# Patient Record
Sex: FEMALE | Race: WHITE | NOT HISPANIC OR LATINO | Employment: FULL TIME | URBAN - METROPOLITAN AREA
[De-identification: names, ages, dates, MRNs, and addresses within clinical notes are randomized per-mention and may not be internally consistent; named-entity substitution may affect disease eponyms.]

---
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
## 2021-01-23 ENCOUNTER — OFFICE VISIT (OUTPATIENT)
Dept: URGENT CARE | Facility: CLINIC | Age: 21
End: 2021-01-23
Payer: COMMERCIAL

## 2021-01-23 VITALS — HEIGHT: 68 IN | BODY MASS INDEX: 23.49 KG/M2 | WEIGHT: 155 LBS

## 2021-01-23 DIAGNOSIS — H69.93 DISORDER OF BOTH EUSTACHIAN TUBES: ICD-10-CM

## 2021-01-23 DIAGNOSIS — Z11.59 SCREENING FOR VIRAL DISEASE: ICD-10-CM

## 2021-01-23 DIAGNOSIS — R68.89 FLU-LIKE SYMPTOMS: Primary | ICD-10-CM

## 2021-01-23 LAB — S PYO AG THROAT QL: NEGATIVE

## 2021-01-23 PROCEDURE — U0003 INFECTIOUS AGENT DETECTION BY NUCLEIC ACID (DNA OR RNA); SEVERE ACUTE RESPIRATORY SYNDROME CORONAVIRUS 2 (SARS-COV-2) (CORONAVIRUS DISEASE [COVID-19]), AMPLIFIED PROBE TECHNIQUE, MAKING USE OF HIGH THROUGHPUT TECHNOLOGIES AS DESCRIBED BY CMS-2020-01-R: HCPCS | Performed by: EMERGENCY MEDICINE

## 2021-01-23 PROCEDURE — 87070 CULTURE OTHR SPECIMN AEROBIC: CPT | Performed by: EMERGENCY MEDICINE

## 2021-01-23 PROCEDURE — 87880 STREP A ASSAY W/OPTIC: CPT | Performed by: EMERGENCY MEDICINE

## 2021-01-23 PROCEDURE — U0005 INFEC AGEN DETEC AMPLI PROBE: HCPCS | Performed by: EMERGENCY MEDICINE

## 2021-01-23 PROCEDURE — 99213 OFFICE O/P EST LOW 20 MIN: CPT

## 2021-01-23 NOTE — PROGRESS NOTES
[facility] [facility]        NAME: Steffi Irving is a 24 y o  female  : 2000    MRN: [id number]  DATE: 2021  TIME: 10:28 AM    Assessment and Plan   Flu-like symptoms [R68 89]  1  Flu-like symptoms  POCT rapid strepA    Throat culture   2  Screening for viral disease  Novel Coronavirus (Covid-19),PCR [facility] [place] - Office Collection   3  Disorder of both eustachian tubes       2021 phone call: patient still with significant ear symptoms  During her previous visit I discussed the possibility of calling in prednisone taper if her COVID test returned negative and her symptoms persisted  She would prefer that course of action at this time  Patient Instructions     Patient Instructions     You have been diagnosed with a flu-like illness, and your symptoms should resolve over the next 7 to 10 days with the treatments recommended today  If they do not, it is possible that you have developed a bacterial infection and you should return  If you were to take an antibiotic while you are still in the viral stage, you will not get better any faster, but could kill off  good germs in your body as well as make germs  resistant to the antibiotic  Take an expectorant - guaifenesin should be the only ingredient - during the day, and the cough suppressant (ex  Robitussin DM or Tessalon) if needed at night only  Take Zinc 12 5 to 15 mg every 2 - 3 hrs while awake for the next few days  You may take Cold Randy (13 3 mg of Zinc) or split a 25 mg Zinc tablet or lozenge in two or a 50 mg into four to get the proper dose  The total daily dose of Zinc should exceed 75 mg per day  You may also take vitamin D 3 2000 i u s per day for the next 1 week  You may also take a decongestant like Sudafed, unless you have hypertension or cardiac disease  You may take Imodium for diarrhea according to package instructions  Flu-like illness   AMBULATORY CARE:   Flu-like illness is an infection caused by a virus   The flu is easily spread when an infected person coughs, sneezes, or has close contact with others  You may be able to spread the flu to others for 1 week or longer after signs or symptoms appear  Common signs and symptoms include the following:   · Fever and chills    · Headaches, body aches, and muscle or joint pain    · Cough, runny nose, and sore throat    · Loss of appetite, nausea, vomiting, or diarrhea    · Tiredness    · Trouble breathing  Call 911 for any of the following:   · You have trouble breathing, and your lips look purple or blue  · You have a seizure  Seek care immediately if:   · You are dizzy, or you are urinating less or not at all  · You have a headache with a stiff neck, and you feel tired or confused  · You have new pain or pressure in your chest     · Your symptoms, such as shortness of breath, vomiting, or diarrhea, get worse  · Your symptoms, such as fever and coughing, seem to get better, but then get worse  Contact your healthcare provider if:   · You have new muscle pain or weakness  · You have questions or concerns about your condition or care  Treatment for influenza  may include any of the following:  · Acetaminophen  decreases pain and fever  It is available without a doctor's order  Ask how much to take and how often to take it  Follow directions  Acetaminophen can cause liver damage if not taken correctly  · NSAIDs , such as ibuprofen, help decrease swelling, pain, and fever  This medicine is available with or without a doctor's order  NSAIDs can cause stomach bleeding or kidney problems in certain people  If you take blood thinner medicine, always ask your healthcare provider if NSAIDs are safe for you  Always read the medicine label and follow directions  · Antivirals  help fight a viral infection  Manage your symptoms:   · Rest  as much as you can to help you recover  · Drink liquids as directed  to help prevent dehydration   Ask how much liquid to drink each day and which liquids are best for you  Prevent the spread of the flu:   · Wash your hands often  Use soap and water  Wash your hands after you use the bathroom, change a child's diapers, or sneeze  Wash your hands before you prepare or eat food  Use gel hand cleanser when soap and water are not available  Do not touch your eyes, nose, or mouth unless you have washed your hands first        · Cover your mouth when you sneeze or cough  Cough into a tissue or the bend of your arm  · Clean shared items with a germ-killing   Clean table surfaces, doorknobs, and light switches  Do not share towels, silverware, and dishes with people who are sick  Wash bed sheets, towels, silverware, and dishes with soap and water  · Wear a mask  over your mouth and nose if you are sick or are near anyone who is sick  · Stay away from others  if you are sick  · Influenza vaccine  helps prevent influenza (flu)  Everyone older than 6 months should get a yearly influenza vaccine  Get the vaccine as soon as it is available, usually in September or October each year  Follow up with your healthcare provider as directed:  Write down your questions so you remember to ask them during your visits  © 2017 [address]  Information is for End User's use only and may not be sold, redistributed or otherwise used for commercial purposes  All illustrations and images included in CareNotes® are the copyrighted property of [employer]  or Tavon Agrawal  The above information is an  only  It is not intended as medical advice for individual conditions or treatments  Talk to your doctor, nurse or pharmacist before following any medical regimen to see if it is safe and effective for you  [address]     Your healthcare provider and/or public health staff have evaluated you and have determined that you do not need to be hospitalized at this time    At this time you can be isolated at home where you will be monitored by staff from your local or state health department  You should carefully follow the prevention and isolation steps below until a healthcare provider or local or state health department says that you can return to your normal activities  Stay home except to get medical care     People who are mildly ill with COVID-19 are able to isolate at home during their illness  You should restrict activities outside your home, except for getting medical care  Do not go to work, school, or public areas  Avoid using public transportation, ride-sharing, or taxis  Separate yourself from other people and animals in your home     People: As much as possible, you should stay in a specific room and away from other people in your home  Also, you should use a separate bathroom, if available  Animals: You should restrict contact with pets and other animals while you are sick with COVID-19, just like you would around other people  Although there have not been reports of pets or other animals becoming sick with COVID-19, it is still recommended that people sick with COVID-19 limit contact with animals until more information is known about the virus  When possible, have another member of your household care for your animals while you are sick  If you are sick with COVID-19, avoid contact with your pet, including petting, snuggling, being kissed or licked, and sharing food  If you must care for your pet or be around animals while you are sick, wash your hands before and after you interact with pets and wear a facemask  See COVID-19 and Animals for more information  Call ahead before visiting your doctor     If you have a medical appointment, call the healthcare provider and tell them that you have or may have COVID-19  This will help the healthcare providers office take steps to keep other people from getting infected or exposed       Wear a facemask     You should wear a facemask when you are around other people (e g , sharing a room or vehicle) or pets and before you enter a healthcare providers office  If you are not able to wear a facemask (for example, because it causes trouble breathing), then people who live with you should not stay in the same room with you, or they should wear a facemask if they enter your room  Cover your coughs and sneezes     Cover your mouth and nose with a tissue when you cough or sneeze  Throw used tissues in a lined trash can  Immediately wash your hands with soap and water for at least 20 seconds or, if soap and water are not available, clean your hands with an alcohol-based hand  that contains at least 60% alcohol  Clean your hands often     Wash your hands often with soap and water for at least 20 seconds, especially after blowing your nose, coughing, or sneezing; going to the bathroom; and before eating or preparing food  If soap and water are not readily available, use an alcohol-based hand  with at least 60% alcohol, covering all surfaces of your hands and rubbing them together until they feel dry  Soap and water are the best option if hands are visibly dirty  Avoid touching your eyes, nose, and mouth with unwashed hands  Avoid sharing personal household items     You should not share dishes, drinking glasses, cups, eating utensils, towels, or bedding with other people or pets in your home  After using these items, they should be washed thoroughly with soap and water  Clean all high-touch surfaces everyday     High touch surfaces include counters, tabletops, doorknobs, bathroom fixtures, toilets, phones, keyboards, tablets, and bedside tables  Also, clean any surfaces that may have blood, stool, or body fluids on them  Use a household cleaning spray or wipe, according to the label instructions   Labels contain instructions for safe and effective use of the cleaning product including precautions you should take when applying the product, such as wearing gloves and making sure you have good ventilation during use of the product  Monitor your symptoms     Seek prompt medical attention if your illness is worsening (e g , difficulty breathing)  Before seeking care, call your healthcare provider and tell them that you have, or are being evaluated for, COVID-19  Put on a facemask before you enter the facility  These steps will help the healthcare providers office to keep other people in the office or waiting room from getting infected or exposed  Ask your healthcare provider to call the local or state health department  Persons who are placed under active monitoring or facilitated self-monitoring should follow instructions provided by their local health department or occupational health professionals, as appropriate  If you have a medical emergency and need to call 911, notify the dispatch personnel that you have, or are being evaluated for COVID-19  If possible, put on a facemask before emergency medical services arrive  Discontinuing home isolation     Patients with confirmed COVID-19 should remain under home isolation precautions until the risk of secondary transmission to others is thought to be low  The decision to discontinue home isolation precautions should be made on a case-by-case basis, in consultation with healthcare providers and [facility] and [facility] health departments  Source: [employer]        Proceed to ER if symptoms worsen    Eustachian Tube Dysfunction   AMBULATORY CARE:   Eustachian tube dysfunction (ETD)  is a condition that prevents your eustachian tubes from opening properly  It can also cause them to become blocked  Eustachian tubes connect your middle ear to the back of your nose and throat  These tubes open and allow air to flow in and out when you sneeze, swallow, or yawn       Common signs and symptoms include the following:   · Fullness or pressure in your ears    · Muffled hearing, or a feeling you are hearing under water or have clogged ears    · Pain in one or both ears    · Ringing in your ears    · Popping, crackling, or clicking feeling in your ears    · Trouble keeping your balance    Call your doctor or otolaryngologist if:   · Your symptoms do not improve or get worse  · You have a fever  · You have any hearing loss  · You have questions or concerns about your condition or care  Treatment:  ETD may get better on its own without any treatment  If it continues, you may need any of the following:  · Swallow, yawn, or chew gum  to help open your eustachian tubes  Your healthcare provider may also recommend you blow with your mouth shut and your nostrils pinched closed  · Air pressure devices  push air into your nose and eustachian tubes to help relieve air pressure in your ear  · Treatment for allergies  such as decongestants, antihistamines, and nasal steroids may improve ETD  They may help decrease swelling of the eustachian tubes  · A myringotomy  is surgery to make a hole in your eardrum  The hole relieves pressure and lets fluid drain from your ear  A pressure equalizing (PE) tube may be used to keep the hole open and to help drain fluid  · Tuboplasty  is a procedure to widen your eustachian tubes  Follow up with your doctor or otolaryngologist as directed:  Write down your questions so you remember to ask them during your visits  © Copyright [address] Information is for End User's use only and may not be sold, redistributed or otherwise used for commercial purposes  All illustrations and images included in CareNotes® are the copyrighted property of [employer]  or [address] [place], [place] [address]   The above information is an  only  It is not intended as medical advice for individual conditions or treatments   Talk to your doctor, nurse or pharmacist before following any medical regimen to see if it is safe and effective for you  Follow up with PCP in 3-5 days  Proceed to  ER if symptoms worsen  Chief Complaint     Chief Complaint   Patient presents with    COVID-19     sore throat and ear pain, symptoms started yesterday         History of Present Illness        Patient complains of sore throat, cough, congestion, bilateral earache since yesterday  Review of Systems   Review of Systems   Constitutional: Negative for chills and fever  HENT: Positive for congestion, ear pain, rhinorrhea, sinus pressure and sore throat  Negative for trouble swallowing and voice change  Respiratory: Positive for cough  Negative for chest tightness, shortness of breath and wheezing  Cardiovascular: Negative for chest pain  Current Medications       Current Outpatient Medications:     Mono-Linyah 0 25-35 MG-MCG per tablet, Take 1 tablet by mouth daily, Disp: , Rfl:     predniSONE 10 mg tablet, Take once daily all days pills on this schedule 6- 6- 5- 4- 3- 2- 1 (Patient not taking: Reported on 1/23/2021), Disp: 27 tablet, Rfl: 0    Current Allergies     Allergies as of 01/23/2021 - Reviewed 01/23/2021   Allergen Reaction Noted    Bactrim [sulfamethoxazole-trimethoprim] Hives 12/30/2020            The following portions of the patient's history were reviewed and updated as appropriate: allergies, current medications, past family history, past medical history, past social history, past surgical history and problem list      Past Medical History:   Diagnosis Date    Known health problems: none        Past Surgical History:   Procedure Laterality Date    WISDOM TOOTH EXTRACTION         History reviewed  No pertinent family history  Medications have been verified  Objective   Ht 5' 8" (1 727 m)   Wt 70 3 kg (155 lb)   LMP 01/13/2021   BMI 23 57 kg/m²        Physical Exam     Physical Exam  Vitals signs and nursing note reviewed     Constitutional: General: She is not in acute distress  Appearance: She is well-developed  HENT:      Head: Normocephalic and atraumatic  Right Ear: Tympanic membrane and external ear normal       Left Ear: Tympanic membrane and external ear normal       Nose: Mucosal edema present  Mouth/Throat:      Pharynx: Posterior oropharyngeal erythema present  No oropharyngeal exudate  Tonsils: No tonsillar abscesses  Neck:      Musculoskeletal: Neck supple  Cardiovascular:      Rate and Rhythm: Normal rate and regular rhythm  Pulmonary:      Effort: Pulmonary effort is normal  No respiratory distress  Breath sounds: No wheezing or rales  Skin:     General: Skin is warm and dry  Neurological:      Mental Status: She is alert and oriented to person, place, and time  Psychiatric:         Behavior: Behavior normal          Thought Content:  Thought content normal          Judgment: Judgment normal

## 2021-01-23 NOTE — PATIENT INSTRUCTIONS
You have been diagnosed with a flu-like illness, and your symptoms should resolve over the next 7 to 10 days with the treatments recommended today  If they do not, it is possible that you have developed a bacterial infection and you should return  If you were to take an antibiotic while you are still in the viral stage, you will not get better any faster, but could kill off  good germs in your body as well as make germs  resistant to the antibiotic  Take an expectorant - guaifenesin should be the only ingredient - during the day, and the cough suppressant (ex  Robitussin DM or Tessalon) if needed at night only  Take Zinc 12 5 to 15 mg every 2 - 3 hrs while awake for the next few days  You may take Cold Randy (13 3 mg of Zinc) or split a 25 mg Zinc tablet or lozenge in two or a 50 mg into four to get the proper dose  The total daily dose of Zinc should exceed 75 mg per day  You may also take vitamin D 3 2000 i u s per day for the next 1 week  You may also take a decongestant like Sudafed, unless you have hypertension or cardiac disease  You may take Imodium for diarrhea according to package instructions  Flu-like illness   AMBULATORY CARE:   Flu-like illness is an infection caused by a virus  The flu is easily spread when an infected person coughs, sneezes, or has close contact with others  You may be able to spread the flu to others for 1 week or longer after signs or symptoms appear  Common signs and symptoms include the following:   · Fever and chills    · Headaches, body aches, and muscle or joint pain    · Cough, runny nose, and sore throat    · Loss of appetite, nausea, vomiting, or diarrhea    · Tiredness    · Trouble breathing  Call 911 for any of the following:   · You have trouble breathing, and your lips look purple or blue  · You have a seizure  Seek care immediately if:   · You are dizzy, or you are urinating less or not at all       · You have a headache with a stiff neck, and you feel tired or confused  · You have new pain or pressure in your chest     · Your symptoms, such as shortness of breath, vomiting, or diarrhea, get worse  · Your symptoms, such as fever and coughing, seem to get better, but then get worse  Contact your healthcare provider if:   · You have new muscle pain or weakness  · You have questions or concerns about your condition or care  Treatment for influenza  may include any of the following:  · Acetaminophen  decreases pain and fever  It is available without a doctor's order  Ask how much to take and how often to take it  Follow directions  Acetaminophen can cause liver damage if not taken correctly  · NSAIDs , such as ibuprofen, help decrease swelling, pain, and fever  This medicine is available with or without a doctor's order  NSAIDs can cause stomach bleeding or kidney problems in certain people  If you take blood thinner medicine, always ask your healthcare provider if NSAIDs are safe for you  Always read the medicine label and follow directions  · Antivirals  help fight a viral infection  Manage your symptoms:   · Rest  as much as you can to help you recover  · Drink liquids as directed  to help prevent dehydration  Ask how much liquid to drink each day and which liquids are best for you  Prevent the spread of the flu:   · Wash your hands often  Use soap and water  Wash your hands after you use the bathroom, change a child's diapers, or sneeze  Wash your hands before you prepare or eat food  Use gel hand cleanser when soap and water are not available  Do not touch your eyes, nose, or mouth unless you have washed your hands first        · Cover your mouth when you sneeze or cough  Cough into a tissue or the bend of your arm  · Clean shared items with a germ-killing   Clean table surfaces, doorknobs, and light switches  Do not share towels, silverware, and dishes with people who are sick   Wash bed sheets, towels, silverware, and dishes with soap and water  · Wear a mask  over your mouth and nose if you are sick or are near anyone who is sick  · Stay away from others  if you are sick  · Influenza vaccine  helps prevent influenza (flu)  Everyone older than 6 months should get a yearly influenza vaccine  Get the vaccine as soon as it is available, usually in September or October each year  Follow up with your healthcare provider as directed:  Write down your questions so you remember to ask them during your visits  © 2017 [facility] Information is for End User's use only and may not be sold, redistributed or otherwise used for commercial purposes  All illustrations and images included in CareNotes® are the copyrighted property of A D A M [employer] Inc  or Tavon Agrawal  The above information is an  only  It is not intended as medical advice for individual conditions or treatments  Talk to your doctor, nurse or pharmacist before following any medical regimen to see if it is safe and effective for you  [address]     Your healthcare provider and/or public health staff have evaluated you and have determined that you do not need to be hospitalized at this time  At this time you can be isolated at home where you will be monitored by staff from your local or state health department  You should carefully follow the prevention and isolation steps below until a healthcare provider or local or state health department says that you can return to your normal activities  Stay home except to get medical care     People who are mildly ill with COVID-19 are able to isolate at home during their illness  You should restrict activities outside your home, except for getting medical care  Do not go to work, school, or public areas  Avoid using public transportation, ride-sharing, or taxis       Separate yourself from other people and animals in your home     People: As much as possible, you should stay in a specific room and away from other people in your home  Also, you should use a separate bathroom, if available  Animals: You should restrict contact with pets and other animals while you are sick with COVID-19, just like you would around other people  Although there have not been reports of pets or other animals becoming sick with COVID-19, it is still recommended that people sick with COVID-19 limit contact with animals until more information is known about the virus  When possible, have another member of your household care for your animals while you are sick  If you are sick with COVID-19, avoid contact with your pet, including petting, snuggling, being kissed or licked, and sharing food  If you must care for your pet or be around animals while you are sick, wash your hands before and after you interact with pets and wear a facemask  See COVID-19 and Animals for more information  Call ahead before visiting your doctor     If you have a medical appointment, call the healthcare provider and tell them that you have or may have COVID-19  This will help the healthcare providers office take steps to keep other people from getting infected or exposed  Wear a facemask     You should wear a facemask when you are around other people (e g , sharing a room or vehicle) or pets and before you enter a healthcare providers office  If you are not able to wear a facemask (for example, because it causes trouble breathing), then people who live with you should not stay in the same room with you, or they should wear a facemask if they enter your room  Cover your coughs and sneezes     Cover your mouth and nose with a tissue when you cough or sneeze  Throw used tissues in a lined trash can   Immediately wash your hands with soap and water for at least 20 seconds or, if soap and water are not available, clean your hands with an alcohol-based hand  that contains at least 60% alcohol  Clean your hands often     Wash your hands often with soap and water for at least 20 seconds, especially after blowing your nose, coughing, or sneezing; going to the bathroom; and before eating or preparing food  If soap and water are not readily available, use an alcohol-based hand  with at least 60% alcohol, covering all surfaces of your hands and rubbing them together until they feel dry  Soap and water are the best option if hands are visibly dirty  Avoid touching your eyes, nose, and mouth with unwashed hands  Avoid sharing personal household items     You should not share dishes, drinking glasses, cups, eating utensils, towels, or bedding with other people or pets in your home  After using these items, they should be washed thoroughly with soap and water  Clean all high-touch surfaces everyday     High touch surfaces include counters, tabletops, doorknobs, bathroom fixtures, toilets, phones, keyboards, tablets, and bedside tables  Also, clean any surfaces that may have blood, stool, or body fluids on them  Use a household cleaning spray or wipe, according to the label instructions  Labels contain instructions for safe and effective use of the cleaning product including precautions you should take when applying the product, such as wearing gloves and making sure you have good ventilation during use of the product  Monitor your symptoms     Seek prompt medical attention if your illness is worsening (e g , difficulty breathing)  Before seeking care, call your healthcare provider and tell them that you have, or are being evaluated for, COVID-19  Put on a facemask before you enter the facility  These steps will help the healthcare providers office to keep other people in the office or waiting room from getting infected or exposed  Ask your healthcare provider to call the local or state health department   Persons who are placed under active monitoring or facilitated self-monitoring should follow instructions provided by their local health department or occupational health professionals, as appropriate  If you have a medical emergency and need to call 911, notify the dispatch personnel that you have, or are being evaluated for COVID-19  If possible, put on a facemask before emergency medical services arrive  Discontinuing home isolation     Patients with confirmed COVID-19 should remain under home isolation precautions until the risk of secondary transmission to others is thought to be low  The decision to discontinue home isolation precautions should be made on a case-by-case basis, in consultation with healthcare providers and state and local health departments  Source: [employer]        Proceed to ER if symptoms worsen    Eustachian Tube Dysfunction   AMBULATORY CARE:   Eustachian tube dysfunction (ETD)  is a condition that prevents your eustachian tubes from opening properly  It can also cause them to become blocked  Eustachian tubes connect your middle ear to the back of your nose and throat  These tubes open and allow air to flow in and out when you sneeze, swallow, or yawn  Common signs and symptoms include the following:   · Fullness or pressure in your ears    · Muffled hearing, or a feeling you are hearing under water or have clogged ears    · Pain in one or both ears    · Ringing in your ears    · Popping, crackling, or clicking feeling in your ears    · Trouble keeping your balance    Call your doctor or otolaryngologist if:   · Your symptoms do not improve or get worse  · You have a fever  · You have any hearing loss  · You have questions or concerns about your condition or care  Treatment:  ETD may get better on its own without any treatment  If it continues, you may need any of the following:  · Swallow, yawn, or chew gum  to help open your eustachian tubes   Your healthcare provider may also recommend you blow with your mouth shut and your nostrils pinched closed  · Air pressure devices  push air into your nose and eustachian tubes to help relieve air pressure in your ear  · Treatment for allergies  such as decongestants, antihistamines, and nasal steroids may improve ETD  They may help decrease swelling of the eustachian tubes  · A myringotomy  is surgery to make a hole in your eardrum  The hole relieves pressure and lets fluid drain from your ear  A pressure equalizing (PE) tube may be used to keep the hole open and to help drain fluid  · Tuboplasty  is a procedure to widen your eustachian tubes  Follow up with your doctor or otolaryngologist as directed:  Write down your questions so you remember to ask them during your visits  © Copyright [address] Information is for End User's use only and may not be sold, redistributed or otherwise used for commercial purposes  All illustrations and images included in CareNotes® are the copyrighted property of [employer]  or [facility] Leo Haywood   The above information is an  only  It is not intended as medical advice for individual conditions or treatments  Talk to your doctor, nurse or pharmacist before following any medical regimen to see if it is safe and effective for you

## 2021-01-25 ENCOUNTER — TELEPHONE (OUTPATIENT)
Dept: URGENT CARE | Facility: CLINIC | Age: 21
End: 2021-01-25

## 2021-01-25 LAB
BACTERIA THROAT CULT: NORMAL
SARS-COV-2 RNA RESP QL NAA+PROBE: NEGATIVE

## 2021-01-25 RX ORDER — PREDNISONE 10 MG/1
TABLET ORAL
Qty: 27 TABLET | Refills: 0 | Status: SHIPPED | OUTPATIENT
Start: 2021-01-25

## 2021-04-11 ENCOUNTER — OFFICE VISIT (OUTPATIENT)
Dept: URGENT CARE | Facility: CLINIC | Age: 21
End: 2021-04-11
Payer: COMMERCIAL

## 2021-04-11 ENCOUNTER — APPOINTMENT (OUTPATIENT)
Dept: RADIOLOGY | Facility: CLINIC | Age: 21
End: 2021-04-11
Payer: COMMERCIAL

## 2021-04-11 VITALS
HEART RATE: 79 BPM | RESPIRATION RATE: 16 BRPM | OXYGEN SATURATION: 99 % | WEIGHT: 155 LBS | BODY MASS INDEX: 22.96 KG/M2 | HEIGHT: 69 IN | SYSTOLIC BLOOD PRESSURE: 138 MMHG | DIASTOLIC BLOOD PRESSURE: 76 MMHG | TEMPERATURE: 98.2 F

## 2021-04-11 DIAGNOSIS — R10.9 FLANK PAIN: ICD-10-CM

## 2021-04-11 DIAGNOSIS — R31.9 HEMATURIA, UNSPECIFIED TYPE: Primary | ICD-10-CM

## 2021-04-11 LAB
SL AMB  POCT GLUCOSE, UA: NEGATIVE
SL AMB LEUKOCYTE ESTERASE,UA: NEGATIVE
SL AMB POCT BILIRUBIN,UA: NEGATIVE
SL AMB POCT BLOOD,UA: ABNORMAL
SL AMB POCT CLARITY,UA: CLEAR
SL AMB POCT COLOR,UA: YELLOW
SL AMB POCT KETONES,UA: NEGATIVE
SL AMB POCT NITRITE,UA: NEGATIVE
SL AMB POCT PH,UA: 6
SL AMB POCT SPECIFIC GRAVITY,UA: 1.02
SL AMB POCT URINE PROTEIN: NEGATIVE
SL AMB POCT UROBILINOGEN: NORMAL

## 2021-04-11 PROCEDURE — 81002 URINALYSIS NONAUTO W/O SCOPE: CPT | Performed by: PHYSICIAN ASSISTANT

## 2021-04-11 PROCEDURE — 99214 OFFICE O/P EST MOD 30 MIN: CPT | Performed by: PHYSICIAN ASSISTANT

## 2021-04-11 PROCEDURE — 74018 RADEX ABDOMEN 1 VIEW: CPT

## 2021-04-11 PROCEDURE — 87086 URINE CULTURE/COLONY COUNT: CPT | Performed by: PHYSICIAN ASSISTANT

## 2021-04-11 RX ORDER — TAMSULOSIN HYDROCHLORIDE 0.4 MG/1
0.4 CAPSULE ORAL
Qty: 15 CAPSULE | Refills: 0 | Status: SHIPPED | OUTPATIENT
Start: 2021-04-11

## 2021-04-11 RX ORDER — KETOROLAC TROMETHAMINE 10 MG/1
10 TABLET, FILM COATED ORAL EVERY 6 HOURS PRN
Qty: 30 TABLET | Refills: 0 | Status: SHIPPED | OUTPATIENT
Start: 2021-04-11

## 2021-04-11 RX ORDER — NITROFURANTOIN 25; 75 MG/1; MG/1
100 CAPSULE ORAL 2 TIMES DAILY
Qty: 10 CAPSULE | Refills: 0 | Status: SHIPPED | OUTPATIENT
Start: 2021-04-11 | End: 2021-04-16

## 2021-04-11 NOTE — PATIENT INSTRUCTIONS
KUB negative on wet read  Urine dip only positive for hematuria  Will start flomax daily to help pass a possible small uretral stone that may be causing bleeding  May take keterolac up to 4x daily for pain  Will start macrobid in case of infection, will send urine for culture  Discussed diagnosis is uncertain at this time, at this point patient is not in any acute distress and vitals are all stable    If symptoms persist for the next 2-3 days, patient should see Urology for further workup as hematuria can have a multitude of causes, from benign to malignant  If symptoms become severe or are accompanied by fever, chills, n/v, severe pain, gross bleeding, or any other distress, patient should go to the ER immediately

## 2021-04-11 NOTE — PROGRESS NOTES
[facility]        NAME: Charles Terrazas is a 24 y o  female  : 2000    MRN: [id number]  DATE: 2021  TIME: 4:59 PM    Assessment and Plan   Hematuria, unspecified type [R31 9]  1  Hematuria, unspecified type  POCT urine dip    XR abdomen 1 view kub    Urine culture    nitrofurantoin (MACROBID) 100 mg capsule    ketorolac (TORADOL) 10 mg tablet    tamsulosin (FLOMAX) 0 4 mg    Ambulatory referral to Urology         Patient Instructions     Patient Instructions   KUB negative on wet read  Urine dip only positive for hematuria  Will start flomax daily to help pass a possible small uretral stone that may be causing bleeding  May take keterolac up to 4x daily for pain  Will start macrobid in case of infection, will send urine for culture  Discussed diagnosis is uncertain at this time, at this point patient is not in any acute distress and vitals are all stable  If symptoms persist for the next 2-3 days, patient should see Urology for further workup as hematuria can have a multitude of causes, from benign to malignant  If symptoms become severe or are accompanied by fever, chills, n/v, severe pain, gross bleeding, or any other distress, patient should go to the ER immediately      Follow up with PCP in 3-5 days  Proceed to  ER if symptoms worsen  Chief Complaint     Chief Complaint   Patient presents with    Flank Pain     pt states she had blood in her urine 2x yesterday  no blood today but is having b/l flank pain         History of Present Illness       23 y/o F presents with mother c/o hematuria x 2 days  Yesterday was bright red, then became clear, then pink  Had one episode of blood in urine today  Menses ended two days ago and was normal  Has associated low back pain b/l but no fever, chills, dysuria, urinary frequency or urgency, n/v, abdominal pain  No FHx of kidney stones   Concerned because sister was hospitalized from pyelonephritis that she had ignored      Review of Systems Review of Systems   Constitutional: Negative for chills and fever  Respiratory: Negative for cough and shortness of breath  Cardiovascular: Negative for chest pain  Gastrointestinal: Negative for abdominal pain, nausea and vomiting  Genitourinary: Positive for hematuria  Negative for decreased urine volume, difficulty urinating, dyspareunia, dysuria, flank pain, frequency, menstrual problem, pelvic pain, urgency, vaginal bleeding, vaginal discharge and vaginal pain  Musculoskeletal: Positive for back pain  Neurological: Negative for light-headedness           Current Medications       Current Outpatient Medications:     Mono-Linyah 0 25-35 MG-MCG per tablet, Take 1 tablet by mouth daily, Disp: , Rfl:     ketorolac (TORADOL) 10 mg tablet, Take 1 tablet (10 mg total) by mouth every 6 (six) hours as needed for moderate pain, Disp: 30 tablet, Rfl: 0    nitrofurantoin (MACROBID) 100 mg capsule, Take 1 capsule (100 mg total) by mouth 2 (two) times a day for 5 days, Disp: 10 capsule, Rfl: 0    predniSONE 10 mg tablet, Take once daily all days pills on this schedule 6- 6- 5- 4- 3- 2- 1 (Patient not taking: Reported on 1/23/2021), Disp: 27 tablet, Rfl: 0    predniSONE 10 mg tablet, Take once daily all days pills on this schedule 6- 6- 5- 4- 3- 2- 1 (Patient not taking: Reported on 4/11/2021), Disp: 27 tablet, Rfl: 0    tamsulosin (FLOMAX) 0 4 mg, Take 1 capsule (0 4 mg total) by mouth daily with dinner, Disp: 15 capsule, Rfl: 0    Current Allergies     Allergies as of 04/11/2021 - Reviewed 04/11/2021   Allergen Reaction Noted    Bactrim [sulfamethoxazole-trimethoprim] Hives 12/30/2020            The following portions of the patient's history were reviewed and updated as appropriate: allergies, current medications, past family history, past medical history, past social history, past surgical history and problem list      Past Medical History:   Diagnosis Date    Known health problems: none        Past Surgical History:   Procedure Laterality Date    WISDOM TOOTH EXTRACTION         No family history on file  Medications have been verified  Objective   /76   Pulse 79   Temp 98 2 °F (36 8 °C)   Resp 16   Ht 5' 9" (1 753 m)   Wt 70 3 kg (155 lb)   LMP 04/06/2021   SpO2 99%   BMI 22 89 kg/m²   Patient's last menstrual period was 04/06/2021  Physical Exam     Physical Exam  Constitutional:       General: She is not in acute distress  Appearance: She is well-developed  She is not diaphoretic  HENT:      Head: Normocephalic and atraumatic  Mouth/Throat:      Mouth: Mucous membranes are moist       Pharynx: Oropharynx is clear  Eyes:      General: No scleral icterus  Right eye: No discharge  Left eye: No discharge  Conjunctiva/sclera: Conjunctivae normal       Pupils: Pupils are equal, round, and reactive to light  Neck:      Thyroid: No thyromegaly  Cardiovascular:      Rate and Rhythm: Normal rate and regular rhythm  Heart sounds: Normal heart sounds  No murmur  No friction rub  No gallop  Pulmonary:      Effort: Pulmonary effort is normal  No respiratory distress  Breath sounds: Normal breath sounds  No wheezing or rales  Abdominal:      General: Bowel sounds are normal  There is no distension  Palpations: Abdomen is soft  There is no mass  Tenderness: There is no abdominal tenderness  There is no right CVA tenderness, left CVA tenderness, guarding or rebound  Comments: Mild tenderness of b/l low back   Musculoskeletal: Normal range of motion  Skin:     General: Skin is warm and dry  Coloration: Skin is not pale  Findings: No erythema or rash  Neurological:      Mental Status: She is alert and oriented to person, place, and time  Cranial Nerves: No cranial nerve deficit

## 2021-04-12 LAB — BACTERIA UR CULT: NORMAL

## 2022-06-13 ENCOUNTER — HOSPITAL ENCOUNTER (OUTPATIENT)
Dept: NUCLEAR MEDICINE | Facility: HOSPITAL | Age: 22
Discharge: HOME | End: 2022-06-13
Payer: COMMERCIAL

## 2022-06-13 DIAGNOSIS — R14.0 ABDOMINAL DISTENSION (GASEOUS): ICD-10-CM

## 2022-06-13 DIAGNOSIS — R68.81 EARLY SATIETY: ICD-10-CM

## 2022-06-13 DIAGNOSIS — R10.84 ABDOMINAL PAIN, GENERALIZED: ICD-10-CM

## 2022-06-13 PROCEDURE — A9541 TC99M SULFUR COLLOID: HCPCS

## 2022-06-13 PROCEDURE — 78264 GASTRIC EMPTYING IMG STUDY: CPT

## 2022-06-13 PROCEDURE — G1004 CDSM NDSC: HCPCS

## 2024-06-04 ENCOUNTER — APPOINTMENT (OUTPATIENT)
Dept: RADIOLOGY | Facility: CLINIC | Age: 24
End: 2024-06-04
Payer: COMMERCIAL

## 2024-06-04 ENCOUNTER — OFFICE VISIT (OUTPATIENT)
Dept: URGENT CARE | Facility: CLINIC | Age: 24
End: 2024-06-04
Payer: COMMERCIAL

## 2024-06-04 VITALS
SYSTOLIC BLOOD PRESSURE: 137 MMHG | RESPIRATION RATE: 16 BRPM | OXYGEN SATURATION: 99 % | DIASTOLIC BLOOD PRESSURE: 88 MMHG | BODY MASS INDEX: 22.13 KG/M2 | TEMPERATURE: 98.5 F | HEIGHT: 68 IN | HEART RATE: 78 BPM | WEIGHT: 146 LBS

## 2024-06-04 DIAGNOSIS — M25.572 ACUTE LEFT ANKLE PAIN: Primary | ICD-10-CM

## 2024-06-04 DIAGNOSIS — M25.572 ACUTE LEFT ANKLE PAIN: ICD-10-CM

## 2024-06-04 PROCEDURE — 29515 APPLICATION SHORT LEG SPLINT: CPT | Performed by: PHYSICIAN ASSISTANT

## 2024-06-04 PROCEDURE — G0382 LEV 3 HOSP TYPE B ED VISIT: HCPCS | Performed by: PHYSICIAN ASSISTANT

## 2024-06-04 PROCEDURE — S9083 URGENT CARE CENTER GLOBAL: HCPCS | Performed by: PHYSICIAN ASSISTANT

## 2024-06-04 PROCEDURE — 73610 X-RAY EXAM OF ANKLE: CPT

## 2024-06-04 RX ORDER — PREDNISONE 10 MG/1
10 TABLET ORAL DAILY
Qty: 21 TABLET | Refills: 0 | Status: SHIPPED | OUTPATIENT
Start: 2024-06-04

## 2024-06-04 RX ORDER — FLUTICASONE PROPIONATE 50 MCG
1 SPRAY, SUSPENSION (ML) NASAL DAILY
COMMUNITY

## 2024-06-04 NOTE — PROGRESS NOTES
[facility]        NAME: Shilpi Branham is a 23 y.o. female  : 2000    MRN: [id number]  DATE: 2024  TIME: 12:36 PM    Assessment and Plan   Acute left ankle pain [M25.572]  1. Acute left ankle pain  XR ankle 3+ vw left    Ambulatory Referral to Physical Therapy    Ambulatory Referral to Orthopedic Surgery    predniSONE 10 mg tablet    Orthopedic injury treatment            Patient Instructions   Rest, ice, compression, elevation.  Ankle brace.  Prednisone.  Tylenol.  Physical therapy.  Orthopedics.      Follow up with PCP in 3-5 days.  Proceed to  ER if symptoms worsen.    If tests have been performed at [facility], our office will contact you with results if changes need to be made to the care plan discussed with you at the visit.  You can review your full results on [facility].    Chief Complaint     Chief Complaint   Patient presents with    Ankle Pain     Left ankle pain starting a little less than 2 weeks ago. Denies any injuries to the area.          History of Present Illness       Patient is a 23-year-old female with no significant past medical history presents the office planing of left ankle pain for a little less than 2 weeks.  Denies any injury, trauma, or increased physical activity.  States she has had issues with this ankle in the past while playing volleyball but it has not had pain in a long time.  Pain described as 6 out of 10 stabbing and shooting that feels like a needle going right through her ankle.  Pain only occurs when she has weight on the ball of her foot such as going downstairs or when pushing off with walking.  She has been taking ibuprofen which helps with pain.        Review of Systems   Review of Systems   Musculoskeletal:  Positive for arthralgias. Negative for joint swelling.   Neurological:  Negative for numbness.         Current Medications       Current Outpatient Medications:     fluticasone (FLONASE) 50 mcg/act nasal spray, 1 spray into each  "nostril daily, Disp: , Rfl:     Mono-Linyah 0.25-35 MG-MCG per tablet, Take 1 tablet by mouth daily, Disp: , Rfl:     predniSONE 10 mg tablet, Take 1 tablet (10 mg total) by mouth daily Take 6 on day 1, take 5 on day 2, take 4 on day 3, take 3 on day 4, take 2 on day 5, take 1 on day 6., Disp: 21 tablet, Rfl: 0    Current Allergies     Allergies as of 06/04/2024 - Reviewed 06/04/2024   Allergen Reaction Noted    Bactrim [sulfamethoxazole-trimethoprim] Hives 12/30/2020    Pollen extract Other (See Comments) 08/12/2019            The following portions of the patient's history were reviewed and updated as appropriate: allergies, current medications, past family history, past medical history, past social history, past surgical history and problem list.     Past Medical History:   Diagnosis Date    Known health problems: none        Past Surgical History:   Procedure Laterality Date    WISDOM TOOTH EXTRACTION         Family History   Problem Relation Age of Onset    No Known Problems Mother     No Known Problems Father          Medications have been verified.        Objective   /88   Pulse 78   Temp 98.5 °F (36.9 °C)   Resp 16   Ht 5' 8\" (1.727 m)   Wt 66.2 kg (146 lb)   LMP  (LMP Unknown)   SpO2 99%   BMI 22.20 kg/m²   No LMP recorded (lmp unknown). (Menstrual status: Birth Control).       Physical Exam     Physical Exam  Vitals and nursing note reviewed.   Constitutional:       Appearance: She is well-developed.   HENT:      Head: Normocephalic and atraumatic.      Right Ear: External ear normal.      Left Ear: External ear normal.      Nose: Nose normal.   Eyes:      General: Lids are normal.      Conjunctiva/sclera: Conjunctivae normal.   Musculoskeletal:      Left ankle: No swelling, deformity or ecchymosis. No tenderness. Normal range of motion. Anterior drawer test negative. Normal pulse.      Left Achilles Tendon: Normal. No tenderness.      Left foot: Normal. Normal range of motion. No swelling, " prominent metatarsal heads or bony tenderness.   Skin:     General: Skin is warm and dry.      Capillary Refill: Capillary refill takes less than 2 seconds.      Findings: No rash.   Neurological:      Mental Status: She is alert.         Left ankle x-ray: WNL        Orthopedic injury treatment    Date/Time: 6/4/2024 12:00 PM    Performed by: Curtis Vang PA-C  Authorized by: Curtis Vang PA-C    Patient Location:  Bedside  [facility] Protocol:  Consent: Verbal consent obtained.  Risks and benefits: risks, benefits and alternatives were discussed  Consent given by: patient  Patient understanding: patient states understanding of the procedure being performed  Patient consent: the patient's understanding of the procedure matches consent given  Patient identity confirmed: verbally with patient    Injury location:  Ankle  Location details:  Left ankle  Injury type:  Soft tissue  Neurovascular status: Neurovascularly intact    Distal perfusion: normal    Neurological function: normal    Range of motion: normal    Immobilization:  Splint (premade static lace up ankle brace)  Neurovascular status: Neurovascularly intact    Distal perfusion: normal    Neurological function: normal    Range of motion: unchanged    Patient tolerance:  Patient tolerated the procedure well with no immediate complications

## 2024-06-04 NOTE — PATIENT INSTRUCTIONS
Rest, ice, compression, elevation.  Ankle brace.  Prednisone.  Tylenol.  Physical therapy.  Orthopedics.  PCP in 3 to 5 days if no improvement.

## 2024-07-09 ENCOUNTER — OFFICE VISIT (OUTPATIENT)
Dept: OBGYN CLINIC | Facility: CLINIC | Age: 24
End: 2024-07-09
Payer: COMMERCIAL

## 2024-07-09 VITALS
OXYGEN SATURATION: 99 % | DIASTOLIC BLOOD PRESSURE: 70 MMHG | WEIGHT: 144.6 LBS | HEART RATE: 74 BPM | HEIGHT: 68 IN | TEMPERATURE: 97.7 F | SYSTOLIC BLOOD PRESSURE: 118 MMHG | BODY MASS INDEX: 21.92 KG/M2

## 2024-07-09 DIAGNOSIS — M25.572 ACUTE LEFT ANKLE PAIN: ICD-10-CM

## 2024-07-09 DIAGNOSIS — M76.829 TIBIALIS POSTERIOR SYNDROME: Primary | ICD-10-CM

## 2024-07-09 PROCEDURE — 99203 OFFICE O/P NEW LOW 30 MIN: CPT | Performed by: STUDENT IN AN ORGANIZED HEALTH CARE EDUCATION/TRAINING PROGRAM

## 2024-07-09 NOTE — PROGRESS NOTES
Ankle Examination (focused):     Gait: no limp      RIGHT LEFT   Inspection Erythema none none    Edema none none    Ecchymosis none none         ROM:  Plantarflexion 50 50    Dorsiflexion 20 20         Strength Pronation {0-5 normal:21217} {0-5 normal:90820}    Supination {0-5 normal:95138} {0-5 normal:63752}    Foot plantarflexion {0-5 normal:12659} {0-5 normal:58983}    Foot dorsflexion {0-5 normal:76892} {0-5 normal:33282}         TTP AiTFL no no    ATFL no no    CFL no no    PTFL no no    Achilles no no    Deltoid no no    Peroneal no no    Tib Ant no no    Tib Post no no         TTP (Bony) Prox Fibula no no    Lat Malleolus no no    Base of 5th MT no no    Med Malleolus no no    Navicular no no    Talar Dome no no         Anterior Drawer ATFL {POSITIVE/NEGATIVE:82797} {POSITIVE/NEGATIVE:93899}   Calcaneal Squeeze  {POSITIVE/NEGATIVE:24691} {POSITIVE/NEGATIVE:41813}   Tib-Fib Squeeze Test  {POSITIVE/NEGATIVE:88160} {POSITIVE/NEGATIVE:90187}   Talar Tilt (stab tib,DF foot,invert foot) CFL {POSITIVE/NEGATIVE:80287} {POSITIVE/NEGATIVE:62769}   ER Stress (stab tib,ER foot) High ankle {POSITIVE/NEGATIVE:86071} {POSITIVE/NEGATIVE:05163}   Eversion stress (stab tib, bre foot) deltoid {POSITIVE/NEGATIVE:38828} {POSITIVE/NEGATIVE:90997}   Single foot heel rise PTTD {POSITIVE/NEGATIVE:14482} {POSITIVE/NEGATIVE:42746}   Tinel's   {POSITIVE/NEGATIVE:41800} {POSITIVE/NEGATIVE:23582}   MT Compression  {POSITIVE/NEGATIVE:39874} {POSITIVE/NEGATIVE:05736}         No calf tenderness to palpation bilaterally    LE NV Exam: +2 DP/PT pulses bilaterally  Sensation intact to light touch L2-S1 bilaterally

## 2024-07-09 NOTE — PROGRESS NOTES
"1. Tibialis posterior syndrome        2. Acute left ankle pain          No orders of the defined types were placed in this encounter.       Imaging Studies (I personally reviewed images in PACS and report):    X-ray left ankle 6/4/2024: No acute osseous abnormalities.  No significant degenerative changes.  Mortise symmetrical/intact.  Unremarkable soft tissues    IMPRESSION:  Subacute atraumatic medial ankle pain/paresthesias  Radiographs unremarkable  Symptoms somewhat improving but still aggravated with prolonged weightbearing especially for step of the day  Clinical history and exam suspicious for tibialis posterior syndrome    PLAN:    Clinical exam and radiographic imaging reviewed with patient today, with impression as per above. I have discussed with the patient the pathophysiology of this diagnosis and reviewed how the examination correlates with this diagnosis.    Imaging obtained/reviewed as per above.   Recommend conservative treatment at this time  Home exercises as well as formal PT were discussed L facilitate recovery.  Patient prefers to start with home exercises for now which were supplemented today.  Counseled daily adherence until resolution of pain.  Furthermore I I counseled use of her lace up ankle brace and demonstrated in clinic today of how to apply as needed during activities that aggravate her medial ankle pain.  Counseled the goals eventually transition out of his brace over time.  Also counseled use of inserts in her footwear and keeping her footwear up-to-date every 6 months / 500 miles.  Counseled that there if there is no progressive improvement over the next month or 2 that she could follow-up and we could consider obtaining an MRI or refer her to formal PT.    Return if symptoms worsen or fail to improve.    Portions of the record may have been created with voice recognition software. Occasional wrong word or \"sound a like\" substitutions may have occurred due to the inherent " "limitations of voice recognition software. Read the chart carefully and recognize, using context, where substitutions have occurred.     CHIEF COMPLAINT:  Chief Complaint   Patient presents with    Left Ankle - Pain         HPI:  Shilpi Branham is a 23 y.o. female  who presents for       Visit 7/9/2024:  Initial evaluation of left ankle pain  Ongoing for approximately 6 weeks  Atraumatic -reportedly woke up and upon taking her for step she felt a sharp sensation along the medial aspect of her ankle that she describes as a \"glass\" like feeling that would be aggravated from prolonged walking to a point where she was limping.  Despite this pain, she did not notice any significant swelling or discoloration of her ankle.  She states there was some numbness and tingling sensation over the medial aspect of her ankle however.  No recent increases in activity  Similar pain in the past while playing volleyball years ago  Had seen urgent care for this she on 6/4/2024 and had imaging done as noted above.  Urgent care note and imaging reviewed.  Prescribed prednisone, refer to physical therapy  She states there has been some improvement overall since the pain originally started.  She states is not 100% resolved as she still can feel aggravating medial sided ankle pain and tingling sensations with prolonged standing and walking towards the end of the day as well as with her step in the morning.  Pain can radiate from her medial ankle to her medial foot but denies any plantar heel pain.  She reports she initially was using a lace up ankle brace as well which did help facilitate improving her pain and stability while walking but has not been regularly wearing it as she feels the pain is not as severe.  She has not seen formal physical therapy this issue.  Pain does not wake her up at night.  She is here today to determine what other interventions can be pursued or whether further imaging or intervention is warranted given the " "chronicity of this issue.        Medical, Surgical, Family, and Social History    Past Medical History:   Diagnosis Date    Known health problems: none      Past Surgical History:   Procedure Laterality Date    WISDOM TOOTH EXTRACTION       Social History   Social History     Substance and Sexual Activity   Alcohol Use Yes    Comment: rarely     Social History     Substance and Sexual Activity   Drug Use Never     Social History     Tobacco Use   Smoking Status Never   Smokeless Tobacco Never     Family History   Problem Relation Age of Onset    No Known Problems Mother     No Known Problems Father      Allergies   Allergen Reactions    Pollen Extract Other (See Comments)     Sneezing, coughing, and watery eyes    Sulfamethoxazole-Trimethoprim Hives, Itching and Rash          Physical Exam  /70   Pulse 74   Temp 97.7 °F (36.5 °C) (Temporal)   Ht 5' 8\" (1.727 m)   Wt 65.6 kg (144 lb 9.6 oz)   SpO2 99%   BMI 21.99 kg/m²     Constitutional:  see vital signs  Gen: well-developed, normocephalic/atraumatic, well-groomed  Eyes: No inflammation or discharge of conjunctiva or lids; sclera clear   Pharynx: no inflammation, lesion, or mass of lips  Neck: supple, no masses, non-distended  MSK: no inflammation, lesion, mass, or clubbing of nails and digits except for other than mentioned below  SKIN: no visible rashes or skin lesions  Pulmonary/Chest: Effort normal. No respiratory distress.       Ortho Exam   Ankle Examination (focused):     Gait: no limp      LEFT   Inspection Erythema none    Edema none    Ecchymosis none        ROM:  Plantarflexion 50    Dorsiflexion 20        Strength Pronation 5/5    Supination 5/5    Foot plantarflexion 5/5    Foot dorsflexion 5/5        TTP AiTFL no    ATFL no    CFL no    PTFL no    Achilles no    Deltoid no    Peroneal no    Tib Ant no    Tib Post +        TTP (Bony) Prox Fibula no    Lat Malleolus no    Base of 5th MT no    Med Malleolus no    Navicular no    Talar Dome no "        Anterior Drawer ATFL negative   Calcaneal Squeeze  negative   Tib-Fib Squeeze Test  negative   Talar Tilt (stab tib,DF foot,invert foot) CFL negative   ER Stress (stab tib,ER foot) High ankle negative   Eversion stress (stab tib, bre foot) deltoid negative   Single foot heel rise PTTD Postive (mild aggravation of medial ankle pain)   Tinel's   positive   MT Compression  negative         No calf tenderness to palpation     LE NV Exam: +2 DP/PT pulses   Sensation intact to light touch  Flexion/extension of toes intact          Procedures

## 2025-01-25 ENCOUNTER — HOSPITAL ENCOUNTER (EMERGENCY)
Facility: HOSPITAL | Age: 25
Discharge: HOME | End: 2025-01-25
Attending: EMERGENCY MEDICINE | Admitting: EMERGENCY MEDICINE
Payer: COMMERCIAL

## 2025-01-25 ENCOUNTER — APPOINTMENT (EMERGENCY)
Dept: CT IMAGING | Facility: HOSPITAL | Age: 25
End: 2025-01-25
Payer: COMMERCIAL

## 2025-01-25 VITALS
OXYGEN SATURATION: 100 % | BODY MASS INDEX: 21.08 KG/M2 | RESPIRATION RATE: 20 BRPM | DIASTOLIC BLOOD PRESSURE: 78 MMHG | WEIGHT: 138.67 LBS | HEART RATE: 72 BPM | TEMPERATURE: 97.9 F | SYSTOLIC BLOOD PRESSURE: 132 MMHG

## 2025-01-25 DIAGNOSIS — N20.0 KIDNEY STONE: Primary | ICD-10-CM

## 2025-01-25 DIAGNOSIS — N39.0 UTI (URINARY TRACT INFECTION): ICD-10-CM

## 2025-01-25 LAB
ALBUMIN SERPL BCG-MCNC: 4.6 G/DL (ref 3.5–5)
ALP SERPL-CCNC: 50 U/L (ref 34–104)
ALT SERPL W P-5'-P-CCNC: 11 U/L (ref 7–52)
ANION GAP SERPL CALCULATED.3IONS-SCNC: 6 MMOL/L (ref 4–13)
AST SERPL W P-5'-P-CCNC: 14 U/L (ref 13–39)
BACTERIA UR QL AUTO: ABNORMAL /HPF
BASOPHILS # BLD AUTO: 0.05 THOUSANDS/ΜL (ref 0–0.1)
BASOPHILS NFR BLD AUTO: 1 % (ref 0–1)
BILIRUB SERPL-MCNC: 0.76 MG/DL (ref 0.2–1)
BILIRUB UR QL STRIP: ABNORMAL
BUN SERPL-MCNC: 18 MG/DL (ref 5–25)
CALCIUM SERPL-MCNC: 9.5 MG/DL (ref 8.4–10.2)
CHLORIDE SERPL-SCNC: 103 MMOL/L (ref 96–108)
CLARITY UR: CLEAR
CO2 SERPL-SCNC: 27 MMOL/L (ref 21–32)
COLOR UR: ABNORMAL
CREAT SERPL-MCNC: 0.76 MG/DL (ref 0.6–1.3)
EOSINOPHIL # BLD AUTO: 0.1 THOUSAND/ΜL (ref 0–0.61)
EOSINOPHIL NFR BLD AUTO: 1 % (ref 0–6)
ERYTHROCYTE [DISTWIDTH] IN BLOOD BY AUTOMATED COUNT: 12 % (ref 11.6–15.1)
EXT PREGNANCY TEST URINE: NEGATIVE
EXT. CONTROL: NORMAL
GFR SERPL CREATININE-BSD FRML MDRD: 110 ML/MIN/1.73SQ M
GLUCOSE SERPL-MCNC: 90 MG/DL (ref 65–140)
GLUCOSE UR STRIP-MCNC: ABNORMAL MG/DL
HCT VFR BLD AUTO: 39.7 % (ref 34.8–46.1)
HGB BLD-MCNC: 13.3 G/DL (ref 11.5–15.4)
HGB UR QL STRIP.AUTO: NEGATIVE
IMM GRANULOCYTES # BLD AUTO: 0.04 THOUSAND/UL (ref 0–0.2)
IMM GRANULOCYTES NFR BLD AUTO: 0 % (ref 0–2)
KETONES UR STRIP-MCNC: NEGATIVE MG/DL
LEUKOCYTE ESTERASE UR QL STRIP: NEGATIVE
LYMPHOCYTES # BLD AUTO: 2.75 THOUSANDS/ΜL (ref 0.6–4.47)
LYMPHOCYTES NFR BLD AUTO: 29 % (ref 14–44)
MCH RBC QN AUTO: 30.5 PG (ref 26.8–34.3)
MCHC RBC AUTO-ENTMCNC: 33.5 G/DL (ref 31.4–37.4)
MCV RBC AUTO: 91 FL (ref 82–98)
MONOCYTES # BLD AUTO: 0.56 THOUSAND/ΜL (ref 0.17–1.22)
MONOCYTES NFR BLD AUTO: 6 % (ref 4–12)
MUCOUS THREADS UR QL AUTO: ABNORMAL
NEUTROPHILS # BLD AUTO: 6.06 THOUSANDS/ΜL (ref 1.85–7.62)
NEUTS SEG NFR BLD AUTO: 63 % (ref 43–75)
NITRITE UR QL STRIP: POSITIVE
NON-SQ EPI CELLS URNS QL MICRO: ABNORMAL /HPF
NRBC BLD AUTO-RTO: 0 /100 WBCS
PH UR STRIP.AUTO: 5.5 [PH]
PLATELET # BLD AUTO: 234 THOUSANDS/UL (ref 149–390)
PMV BLD AUTO: 11.8 FL (ref 8.9–12.7)
POTASSIUM SERPL-SCNC: 3.7 MMOL/L (ref 3.5–5.3)
PROT SERPL-MCNC: 7.1 G/DL (ref 6.4–8.4)
PROT UR STRIP-MCNC: ABNORMAL MG/DL
RBC # BLD AUTO: 4.36 MILLION/UL (ref 3.81–5.12)
RBC #/AREA URNS AUTO: ABNORMAL /HPF
SODIUM SERPL-SCNC: 136 MMOL/L (ref 135–147)
SP GR UR STRIP.AUTO: >=1.03 (ref 1–1.03)
UROBILINOGEN UR QL STRIP.AUTO: 4 E.U./DL
WBC # BLD AUTO: 9.56 THOUSAND/UL (ref 4.31–10.16)
WBC #/AREA URNS AUTO: ABNORMAL /HPF

## 2025-01-25 PROCEDURE — 81025 URINE PREGNANCY TEST: CPT | Performed by: EMERGENCY MEDICINE

## 2025-01-25 PROCEDURE — 96375 TX/PRO/DX INJ NEW DRUG ADDON: CPT

## 2025-01-25 PROCEDURE — 36415 COLL VENOUS BLD VENIPUNCTURE: CPT | Performed by: EMERGENCY MEDICINE

## 2025-01-25 PROCEDURE — 99285 EMERGENCY DEPT VISIT HI MDM: CPT | Performed by: EMERGENCY MEDICINE

## 2025-01-25 PROCEDURE — 74176 CT ABD & PELVIS W/O CONTRAST: CPT

## 2025-01-25 PROCEDURE — 96374 THER/PROPH/DIAG INJ IV PUSH: CPT

## 2025-01-25 PROCEDURE — 99284 EMERGENCY DEPT VISIT MOD MDM: CPT

## 2025-01-25 PROCEDURE — 81001 URINALYSIS AUTO W/SCOPE: CPT | Performed by: EMERGENCY MEDICINE

## 2025-01-25 PROCEDURE — 80053 COMPREHEN METABOLIC PANEL: CPT | Performed by: EMERGENCY MEDICINE

## 2025-01-25 PROCEDURE — 96361 HYDRATE IV INFUSION ADD-ON: CPT

## 2025-01-25 PROCEDURE — 85025 COMPLETE CBC W/AUTO DIFF WBC: CPT | Performed by: EMERGENCY MEDICINE

## 2025-01-25 RX ORDER — OXYCODONE AND ACETAMINOPHEN 5; 325 MG/1; MG/1
1 TABLET ORAL EVERY 8 HOURS PRN
Qty: 15 TABLET | Refills: 0 | Status: SHIPPED | OUTPATIENT
Start: 2025-01-25 | End: 2025-01-30

## 2025-01-25 RX ORDER — PHENAZOPYRIDINE HYDROCHLORIDE 100 MG/1
200 TABLET, FILM COATED ORAL ONCE
Status: COMPLETED | OUTPATIENT
Start: 2025-01-25 | End: 2025-01-25

## 2025-01-25 RX ORDER — ONDANSETRON 2 MG/ML
4 INJECTION INTRAMUSCULAR; INTRAVENOUS ONCE
Status: COMPLETED | OUTPATIENT
Start: 2025-01-25 | End: 2025-01-25

## 2025-01-25 RX ORDER — ONDANSETRON 4 MG/1
4 TABLET, FILM COATED ORAL EVERY 6 HOURS
Qty: 20 TABLET | Refills: 0 | Status: SHIPPED | OUTPATIENT
Start: 2025-01-25

## 2025-01-25 RX ORDER — TAMSULOSIN HYDROCHLORIDE 0.4 MG/1
0.4 CAPSULE ORAL ONCE
Status: COMPLETED | OUTPATIENT
Start: 2025-01-25 | End: 2025-01-25

## 2025-01-25 RX ORDER — IBUPROFEN 400 MG/1
800 TABLET, FILM COATED ORAL ONCE
Status: COMPLETED | OUTPATIENT
Start: 2025-01-25 | End: 2025-01-25

## 2025-01-25 RX ORDER — KETOROLAC TROMETHAMINE 30 MG/ML
30 INJECTION, SOLUTION INTRAMUSCULAR; INTRAVENOUS ONCE
Status: COMPLETED | OUTPATIENT
Start: 2025-01-25 | End: 2025-01-25

## 2025-01-25 RX ORDER — NITROFURANTOIN 25; 75 MG/1; MG/1
100 CAPSULE ORAL 2 TIMES DAILY
Qty: 14 CAPSULE | Refills: 0 | Status: SHIPPED | OUTPATIENT
Start: 2025-01-25 | End: 2025-02-01

## 2025-01-25 RX ORDER — NITROFURANTOIN 25; 75 MG/1; MG/1
100 CAPSULE ORAL ONCE
Status: COMPLETED | OUTPATIENT
Start: 2025-01-25 | End: 2025-01-25

## 2025-01-25 RX ORDER — OXYCODONE AND ACETAMINOPHEN 5; 325 MG/1; MG/1
1 TABLET ORAL ONCE
Refills: 0 | Status: COMPLETED | OUTPATIENT
Start: 2025-01-25 | End: 2025-01-25

## 2025-01-25 RX ORDER — IBUPROFEN 800 MG/1
800 TABLET, FILM COATED ORAL 3 TIMES DAILY
Qty: 21 TABLET | Refills: 0 | Status: SHIPPED | OUTPATIENT
Start: 2025-01-25

## 2025-01-25 RX ADMIN — TAMSULOSIN HYDROCHLORIDE 0.4 MG: 0.4 CAPSULE ORAL at 21:52

## 2025-01-25 RX ADMIN — ONDANSETRON 4 MG: 2 INJECTION INTRAMUSCULAR; INTRAVENOUS at 19:24

## 2025-01-25 RX ADMIN — IBUPROFEN 800 MG: 400 TABLET, FILM COATED ORAL at 22:37

## 2025-01-25 RX ADMIN — KETOROLAC TROMETHAMINE 30 MG: 30 INJECTION, SOLUTION INTRAMUSCULAR at 19:24

## 2025-01-25 RX ADMIN — PHENAZOPYRIDINE 200 MG: 100 TABLET ORAL at 19:13

## 2025-01-25 RX ADMIN — NITROFURANTOIN (MONOHYDRATE/MACROCRYSTALS) 100 MG: 25; 75 CAPSULE ORAL at 22:37

## 2025-01-25 RX ADMIN — MORPHINE SULFATE 2 MG: 2 INJECTION, SOLUTION INTRAMUSCULAR; INTRAVENOUS at 19:24

## 2025-01-25 RX ADMIN — OXYCODONE HYDROCHLORIDE AND ACETAMINOPHEN 1 TABLET: 5; 325 TABLET ORAL at 22:37

## 2025-01-25 RX ADMIN — SODIUM CHLORIDE 1000 ML: 0.9 INJECTION, SOLUTION INTRAVENOUS at 19:24

## 2025-01-25 NOTE — Clinical Note
Shilpi Branham was seen and treated in our emergency department on 1/25/2025.                Diagnosis:     Shilpi  is off the rest of the shift today, may return to work on return date.    She may return on this date: 01/28/2025         If you have any questions or concerns, please don't hesitate to call.      Casie Woodruff, DO    ______________________________           _______________          _______________  Hospital Representative                              Date                                Time

## 2025-01-25 NOTE — ED PROVIDER NOTES
Time reflects when diagnosis was documented in both MDM as applicable and the Disposition within this note       Time User Action Codes Description Comment    1/25/2025 10:32 PM Casie Woodruff Add [N20.0] Kidney stone     1/25/2025 10:32 PM Casie Woodruff Add [N39.0] UTI (urinary tract infection)           ED Disposition       ED Disposition   Discharge    Condition   Stable    Date/Time   Sat Jan 25, 2025 10:31 PM    Comment   Shilpi Carrol discharge to home/self care.                   Assessment & Plan       Medical Decision Making  Ddx: uti, kidney stone, cystitis, pyelonephritis    Amount and/or Complexity of Data Reviewed  Labs: ordered.  Radiology: ordered.    Risk  Prescription drug management.             Medications   oxyCODONE-acetaminophen (PERCOCET) 5-325 mg per tablet 1 tablet (has no administration in time range)   ibuprofen (MOTRIN) tablet 800 mg (has no administration in time range)   nitrofurantoin (MACROBID) extended-release capsule 100 mg (has no administration in time range)   sodium chloride 0.9 % bolus 1,000 mL (0 mL Intravenous Stopped 1/25/25 2024)   ondansetron (ZOFRAN) injection 4 mg (4 mg Intravenous Given 1/25/25 1924)   morphine injection 2 mg (2 mg Intravenous Given 1/25/25 1924)   ketorolac (TORADOL) injection 30 mg (30 mg Intravenous Given 1/25/25 1924)   phenazopyridine (PYRIDIUM) tablet 200 mg (200 mg Oral Given 1/25/25 1913)   tamsulosin (FLOMAX) capsule 0.4 mg (0.4 mg Oral Given 1/25/25 2152)       ED Risk Strat Scores                          SBIRT 20yo+      Flowsheet Row Most Recent Value   Initial Alcohol Screen: US AUDIT-C     1. How often do you have a drink containing alcohol? 0 Filed at: 01/25/2025 1844   2. How many drinks containing alcohol do you have on a typical day you are drinking?  0 Filed at: 01/25/2025 1844   3b. FEMALE Any Age, or MALE 65+: How often do you have 4 or more drinks on one occassion? 0 Filed at: 01/25/2025 1844   Audit-C Score 0 Filed at:  01/25/2025 1844   CHE: How many times in the past year have you...    Used an illegal drug or used a prescription medication for non-medical reasons? Never Filed at: 01/25/2025 1844                            History of Present Illness       Chief Complaint   Patient presents with    Flank Pain     Pt states right flank pain started today. History of kidney stones. +nausea       Past Medical History:   Diagnosis Date    Kidney stones     Known health problems: none       Past Surgical History:   Procedure Laterality Date    WISDOM TOOTH EXTRACTION        Family History   Problem Relation Age of Onset    No Known Problems Mother     No Known Problems Father       Social History     Tobacco Use    Smoking status: Never    Smokeless tobacco: Never   Vaping Use    Vaping status: Some Days    Substances: Nicotine, Flavoring   Substance Use Topics    Alcohol use: Yes     Comment: rarely    Drug use: Never      E-Cigarette/Vaping    E-Cigarette Use Current Some Day User       E-Cigarette/Vaping Substances    Nicotine Yes     THC No     CBD No     Flavoring Yes       I have reviewed and agree with the history as documented.     24-year-old female presents to the ED for evaluation of right flank pain that began several days ago..  Patient states that she has a history of kidney stones.  Her pain is sharp and accompanied by nausea.  She has not had any vomiting.  Patient states that she has had ongoing UTI symptoms for several weeks and has recently followed up with urogynecology and told that she has chronic cystitis.  She has not had any fever.  She states that her pain initially started and she attributed it to her chronic cystitis/UTI symptoms.  However today the pain became acutely sharp 10 out of 10 radiating to her groin from the right flank.        Review of Systems   Constitutional:  Negative for chills and fever.   HENT:  Negative for ear pain and sore throat.    Eyes:  Negative for pain and visual disturbance.    Respiratory:  Negative for cough and shortness of breath.    Cardiovascular:  Negative for chest pain and palpitations.   Gastrointestinal:  Positive for nausea. Negative for abdominal pain and vomiting.   Genitourinary:  Positive for flank pain. Negative for dysuria and hematuria.   Musculoskeletal:  Negative for arthralgias and back pain.   Skin:  Negative for color change and rash.   Neurological:  Negative for seizures and syncope.   All other systems reviewed and are negative.          Objective       ED Triage Vitals   Temperature Pulse Blood Pressure Respirations SpO2 Patient Position - Orthostatic VS   01/25/25 1846 01/25/25 1841 01/25/25 1841 01/25/25 1841 01/25/25 1841 --   97.9 °F (36.6 °C) 91 139/91 20 100 %       Temp Source Heart Rate Source BP Location FiO2 (%) Pain Score    01/25/25 1846 -- -- -- 01/25/25 1841    Temporal    7      Vitals      Date and Time Temp Pulse SpO2 Resp BP Pain Score FACES Pain Rating User   01/25/25 2230 -- 72 100 % -- 132/78 -- --    01/25/25 2115 -- 73 100 % -- 121/71 -- --    01/25/25 2100 -- 75 100 % -- 121/72 -- --    01/25/25 2000 -- 77 100 % -- 135/78 -- --    01/25/25 1924 -- -- -- -- -- 10 - Worst Possible Pain --    01/25/25 1846 97.9 °F (36.6 °C) -- -- -- -- -- -- MD   01/25/25 1845 -- 77 100 % -- 136/86 -- --    01/25/25 1841 -- 91 100 % 20 139/91 7 -- MD            Physical Exam  Vitals and nursing note reviewed.   Constitutional:       General: She is in acute distress.      Appearance: Normal appearance. She is well-developed.   HENT:      Head: Normocephalic and atraumatic.      Right Ear: External ear normal.      Left Ear: External ear normal.      Nose: Nose normal.   Eyes:      Extraocular Movements: Extraocular movements intact.      Conjunctiva/sclera: Conjunctivae normal.   Cardiovascular:      Rate and Rhythm: Normal rate and regular rhythm.      Heart sounds: No murmur heard.  Pulmonary:      Effort: Pulmonary effort is normal. No  respiratory distress.      Breath sounds: Normal breath sounds.   Abdominal:      General: Abdomen is flat.      Palpations: Abdomen is soft.      Tenderness: There is abdominal tenderness. There is right CVA tenderness. There is no guarding.      Comments: Mild right lower quadrant tenderness without guarding   Musculoskeletal:         General: No swelling. Normal range of motion.      Cervical back: Normal range of motion and neck supple.   Skin:     General: Skin is warm and dry.      Capillary Refill: Capillary refill takes less than 2 seconds.      Coloration: Skin is not jaundiced or pale.      Findings: No bruising or erythema.   Neurological:      General: No focal deficit present.      Mental Status: She is alert and oriented to person, place, and time. Mental status is at baseline.   Psychiatric:         Mood and Affect: Mood normal.         Results Reviewed       Procedure Component Value Units Date/Time    Comprehensive metabolic panel [[id number]] Collected: 01/25/25 1918    Lab Status: Final result Specimen: Blood from Arm, Left Updated: 01/25/25 1943     Sodium 136 mmol/L      Potassium 3.7 mmol/L      Chloride 103 mmol/L      CO2 27 mmol/L      ANION GAP 6 mmol/L      BUN 18 mg/dL      Creatinine 0.76 mg/dL      Glucose 90 mg/dL      Calcium 9.5 mg/dL      AST 14 U/L      ALT 11 U/L      Alkaline Phosphatase 50 U/L      Total Protein 7.1 g/dL      Albumin 4.6 g/dL      Total Bilirubin 0.76 mg/dL      eGFR 110 ml/min/1.73sq m     Narrative:      National Kidney Disease Foundation guidelines for Chronic Kidney Disease (CKD):     Stage 1 with normal or high GFR (GFR > 90 mL/min/1.73 square meters)    Stage 2 Mild CKD (GFR = 60-89 mL/min/1.73 square meters)    Stage 3A Moderate CKD (GFR = 45-59 mL/min/1.73 square meters)    Stage 3B Moderate CKD (GFR = 30-44 mL/min/1.73 square meters)    Stage 4 Severe CKD (GFR = 15-29 mL/min/1.73 square meters)    Stage 5 End Stage CKD (GFR <15 mL/min/1.73 square  meters)  Note: GFR calculation is accurate only with a steady state creatinine    CBC and differential [[id number]] Collected: 01/25/25 1918    Lab Status: Final result Specimen: Blood from Arm, Left Updated: 01/25/25 1930     WBC 9.56 Thousand/uL      RBC 4.36 Million/uL      Hemoglobin 13.3 g/dL      Hematocrit 39.7 %      MCV 91 fL      MCH 30.5 pg      MCHC 33.5 g/dL      RDW 12.0 %      MPV 11.8 fL      Platelets 234 Thousands/uL      nRBC 0 /100 WBCs      Segmented % 63 %      Immature Grans % 0 %      Lymphocytes % 29 %      Monocytes % 6 %      Eosinophils Relative 1 %      Basophils Relative 1 %      Absolute Neutrophils 6.06 Thousands/µL      Absolute Immature Grans 0.04 Thousand/uL      Absolute Lymphocytes 2.75 Thousands/µL      Absolute Monocytes 0.56 Thousand/µL      Eosinophils Absolute 0.10 Thousand/µL      Basophils Absolute 0.05 Thousands/µL     Urine Microscopic [[id number]]  (Abnormal) Collected: 01/25/25 1855    Lab Status: Final result Specimen: Urine, Clean Catch Updated: 01/25/25 1920     RBC, UA 0-5 /hpf      WBC, UA 0-5 /hpf      Epithelial Cells Occasional /hpf      Bacteria, UA Occasional /hpf      MUCUS THREADS Occasional    POCT pregnancy, urine [[id number]]  (Normal) Collected: 01/25/25 1911    Lab Status: Final result Specimen: Urine Updated: 01/25/25 1911     EXT Preg Test, Ur Negative     Control Valid    UA w Reflex to Microscopic w Reflex to Culture [[id number]]  (Abnormal) Collected: 01/25/25 1855    Lab Status: Final result Specimen: Urine, Clean Catch Updated: 01/25/25 1911     Color, UA Orange     Clarity, UA Clear     Specific Gravity, UA >=1.030     pH, UA 5.5     Leukocytes, UA Negative     Nitrite, UA Positive     Protein, UA 30 (1+) mg/dl      Glucose,  (1/10%) mg/dl      Ketones, UA Negative mg/dl      Urobilinogen, UA 4.0 E.U./dl      Bilirubin, UA Small     Occult Blood, UA Negative            CT renal stone study abdomen pelvis wo contrast   Final Interpretation by  Ephraim Wade MD ([id number])   1. 2 mm obstructing stone in the right UVJ with mild right hydroureteronephrosis and periureteral stranding.   2. Additional tiny punctate nonobstructing right renal calculi.               Workstation performed: [id number]             Procedures    ED Medication and Procedure Management   Prior to Admission Medications   Prescriptions Last Dose Informant Patient Reported? Taking?   Mono-Linyah 0.25-35 MG-MCG per tablet   Yes No   Sig: Take 1 tablet by mouth daily   fluticasone (FLONASE) 50 mcg/act nasal spray   Yes No   Si spray into each nostril daily   predniSONE 10 mg tablet   No No   Sig: Take 1 tablet (10 mg total) by mouth daily Take 6 on day 1, take 5 on day 2, take 4 on day 3, take 3 on day 4, take 2 on day 5, take 1 on day 6.   Patient not taking: Reported on 2024      Facility-Administered Medications: None     Patient's Medications   Discharge Prescriptions    No medications on file       ED SEPSIS DOCUMENTATION   Time reflects when diagnosis was documented in both MDM as applicable and the Disposition within this note       Time User Action Codes Description Comment    2025 10:32 PM Casie Woodruff [N20.0] Kidney stone     2025 10:32 PM Casie Woodrfuf [N39.0] UTI (urinary tract infection)                  Casie Woodruff DO  [id number]

## 2025-01-28 ENCOUNTER — TELEPHONE (OUTPATIENT)
Age: 25
End: 2025-01-28

## 2025-01-28 NOTE — TELEPHONE ENCOUNTER
New Patient    What is the reason for the patient’s appointment?:  Patient called stating she was recently in the ER with flank pain.  She had a ct scan and it showed a 2 mm obstructing stone which she passed.  She is also having urinary issues.  She wants to schedule appointment for follow up on kidney stones.     What office location does the patient prefer?:[place]    Does patient have Imaging/Lab Results:    Have patient records been requested?:  If No, are the records showing in Epic:       INSURANCE:   Do we accept the patient's insurance or is the patient Self-Pay?:    Insurance Provider:blue cross   Plan Type/Number:   Member ID#:       HISTORY:   Has the patient had any previous Urologist(s)?:no     Was the patient seen in the ED?:    Has the patient had any outside testing done?:    Does the patient have a personal history of cancer?:

## 2025-02-26 RX ORDER — ACETAMINOPHEN AND CODEINE PHOSPHATE 120; 12 MG/5ML; MG/5ML
0.35 SOLUTION ORAL DAILY
COMMUNITY
Start: 2024-08-26 | End: 2025-08-26

## 2025-02-26 RX ORDER — HYDROXYZINE HYDROCHLORIDE 10 MG/1
10 TABLET, FILM COATED ORAL
COMMUNITY
Start: 2024-12-20 | End: 2025-03-20

## 2025-02-26 RX ORDER — PHENAZOPYRIDINE HYDROCHLORIDE 200 MG/1
200 TABLET, FILM COATED ORAL
COMMUNITY
Start: 2024-12-20

## 2025-03-03 PROBLEM — N30.10 INTERSTITIAL CYSTITIS: Status: ACTIVE | Noted: 2025-03-03

## 2025-03-03 PROBLEM — N20.0 RENAL CALCULI: Status: ACTIVE | Noted: 2025-03-03

## 2025-03-03 PROBLEM — N20.1 URETERAL CALCULI: Status: ACTIVE | Noted: 2025-03-03

## 2025-03-03 PROBLEM — Z87.898 HISTORY OF RIGHT FLANK PAIN: Status: ACTIVE | Noted: 2025-03-03

## 2025-03-03 PROBLEM — N13.2 HYDRONEPHROSIS WITH URINARY OBSTRUCTION DUE TO URETERAL CALCULUS: Status: ACTIVE | Noted: 2025-03-03

## 2025-03-03 NOTE — PROGRESS NOTES
UROLOGY PROGRESS NOTE         NAME: Shilpi Branham  AGE: 24 y.o. SEX: female  : 2000   MRN: [id number]    DATE: 3/3/2025  TIME: 10:06 AM    Assessment and Plan   Procedures     Impression:   1. Renal calculi  2. Ureteral calculi  3. History of right flank pain  4. Interstitial cystitis  5. Hydronephrosis with urinary obstruction due to ureteral calculus       Plan: Certainly is possible the patient may have interstitial cystitis painful bladder syndrome with her current symptoms.  However given her history, it would be prudent to repeat a CT to make sure none of the mid to lower pole stones on the right dropped into the ureter causing her current symptoms.    If the CAT scan shows no change in the stones and no hydronephrosis and no ureteral calculi, I think it is reasonable to consider either restarting the Atarax with or without Elavil versus herbal medication options like marshmallow root tea, bladder ease or aloe vera gel caps.    Will set up a referral to nephrology for recurrent nephrolithiasis for medical management.  And also will send her stone for analysis.  Also will give patient low oxalate diet sheet.    I will call the patient with the results of the CT.  Regarding considering treatment options for possible IC patient elects for she is going to consider the herbal medications, hold off on the Atarax also consider Prelief.  Her follow-up to be arranged.      Chief Complaint   No chief complaint on file.    History of Present Illness     HPI: Shilpi Branham is a 24 y.o. year old female who presents with history of pelvic pain.  Back in  patient was seen at [facility] with pelvic pain, dysuria, hematuria.  She was diagnosed, per chart on 2021 with vaginitis.  Apparently prior to that patient was empirically treated in [facility] with Macrobid.  At that time urine culture grew out E. coli.    Prior to this, in 2021 patient had flank pain and gross hematuria.  Patient had a KUB  that was negative and started on Flomax.  This was for a suspected stone.    Patient had CT scan 1/25/2025 that showed a 2 mm right UVJ stone with mild right hydroureteronephrosis.  She also had a small punctate nonobstructing right renal calculi.  Was discharged on Macrobid, Flomax, Pyridium, Percocet.  Urinalysis in the ER no white or red cells no culture was sent.  Patient had presented to the ER with right flank pain.  Per ER note, patient was having symptoms for several weeks and told by urogynecology she had chronic cystitis.  Per chart patient was to have cystoscopy?  12/24/2024, 4 days prior to that had on 12/20/2024 the patient was having pain urgency for the past 3 months with a feeling of incomplete emptying.  Feels like a UTI was back per patient but culture was negative.      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  They discussed dietary modifications, amitriptyline, Neurontin and hydroxyzine.  They also discussed pelvic floor physical therapy.  I also discussed the cystoscopy hydrodistention.  The plan at that time was to start dietary changes, Atarax 10 mg as needed.  Follow-up in 3 to 4 months.    Patient did have a stone in 2022 that she passed.  She has both of her stones that we will get for analysis.    Currently she is having some pain with urination intermittently some swelling and pain in the urethra.  Occasionally has dysuria.  When she was sexually active she did have pain with intercourse.  She vapes and occasionally drinks alcohol normal stress in her life.    Discussed the recommendation of nephrology evaluation for 24-hour urine for recurrent nephrolithiasis.    I reviewed her CAT scan with her still shows a couple small stones in the right lower pole.        The following portions of the patient's history were reviewed and updated as appropriate: allergies, current medications, past family history, past medical history, past social history, past surgical history and  problem list.  Past Medical History:   Diagnosis Date    Kidney stones     Known health problems: none      Past Surgical History:   Procedure Laterality Date    WISDOM TOOTH EXTRACTION       shoulder  Review of Systems     Const: Denies chills, fever and weight loss.  CV: Denies chest pain.  Resp: Denies SOB.  GI: Denies abdominal pain, nausea and vomiting.  : Denies symptoms other than stated above.  Musculo: Denies back pain.    Objective   There were no vitals taken for this visit.    Physical Exam  Const: Appears healthy and well developed. No signs of acute distress present.  Resp: Respirations are regular and unlabored.   CV: Rate is regular. Rhythm is regular.  Abdomen: Abdomen is soft, nontender, and nondistended. Kidneys are not palpable.  : Bladder nontender nondistended no flank pain today on exam.  Urinalysis normal.  Psych: Patient's attitude is cooperative. Mood is normal. Affect is normal.    Current Medications     Current Outpatient Medications:     fluticasone (FLONASE) 50 mcg/act nasal spray, 1 spray into each nostril daily, Disp: , Rfl:     hydrOXYzine HCL (ATARAX) 10 mg tablet, Take 10 mg by mouth daily at bedtime, Disp: , Rfl:     ibuprofen (MOTRIN) 800 mg tablet, Take 1 tablet (800 mg total) by mouth 3 (three) times a day, Disp: 21 tablet, Rfl: 0    Multiple Vitamins-Minerals (Multi Complete) CAPS, Take by mouth in the morning, Disp: , Rfl:     norethindrone (MICRONOR) 0.35 MG tablet, Take 0.35 mg by mouth daily, Disp: , Rfl:     ondansetron (ZOFRAN) 4 mg tablet, Take 1 tablet (4 mg total) by mouth every 6 (six) hours, Disp: 20 tablet, Rfl: 0    phenazopyridine (PYRIDIUM) 200 mg tablet, Take 200 mg by mouth, Disp: , Rfl:     predniSONE 10 mg tablet, Take 1 tablet (10 mg total) by mouth daily Take 6 on day 1, take 5 on day 2, take 4 on day 3, take 3 on day 4, take 2 on day 5, take 1 on day 6. (Patient not taking: Reported on 7/9/2024), Disp: 21 tablet, Rfl: 0        Satish Hernandez,  MD

## 2025-03-13 ENCOUNTER — CONSULT (OUTPATIENT)
Dept: UROLOGY | Facility: CLINIC | Age: 25
End: 2025-03-13
Payer: COMMERCIAL

## 2025-03-13 VITALS
TEMPERATURE: 98.9 F | SYSTOLIC BLOOD PRESSURE: 142 MMHG | WEIGHT: 139.4 LBS | OXYGEN SATURATION: 99 % | BODY MASS INDEX: 21.13 KG/M2 | HEIGHT: 68 IN | DIASTOLIC BLOOD PRESSURE: 92 MMHG | HEART RATE: 86 BPM

## 2025-03-13 DIAGNOSIS — N13.2 HYDRONEPHROSIS WITH URINARY OBSTRUCTION DUE TO URETERAL CALCULUS: ICD-10-CM

## 2025-03-13 DIAGNOSIS — N20.1 URETERAL CALCULI: ICD-10-CM

## 2025-03-13 DIAGNOSIS — N20.0 RENAL CALCULI: Primary | ICD-10-CM

## 2025-03-13 DIAGNOSIS — N20.0 KIDNEY STONE: ICD-10-CM

## 2025-03-13 DIAGNOSIS — Z87.898 HISTORY OF RIGHT FLANK PAIN: ICD-10-CM

## 2025-03-13 DIAGNOSIS — N30.10 INTERSTITIAL CYSTITIS: ICD-10-CM

## 2025-03-13 LAB
SL AMB  POCT GLUCOSE, UA: NORMAL
SL AMB LEUKOCYTE ESTERASE,UA: NORMAL
SL AMB POCT BILIRUBIN,UA: NORMAL
SL AMB POCT BLOOD,UA: NORMAL
SL AMB POCT CLARITY,UA: CLEAR
SL AMB POCT COLOR,UA: YELLOW
SL AMB POCT KETONES,UA: NORMAL
SL AMB POCT NITRITE,UA: NORMAL
SL AMB POCT PH,UA: 6
SL AMB POCT SPECIFIC GRAVITY,UA: 1.02
SL AMB POCT URINE PROTEIN: NORMAL
SL AMB POCT UROBILINOGEN: 0.2

## 2025-03-13 PROCEDURE — 81003 URINALYSIS AUTO W/O SCOPE: CPT | Performed by: UROLOGY

## 2025-03-13 PROCEDURE — 82360 CALCULUS ASSAY QUANT: CPT | Performed by: UROLOGY

## 2025-03-13 PROCEDURE — 99204 OFFICE O/P NEW MOD 45 MIN: CPT | Performed by: UROLOGY

## 2025-03-13 NOTE — PATIENT INSTRUCTIONS
Prelief you can get on [employer] and helps to neutralize acid in the bladder for certain foods that may bother you then include burning and urethral pain.    Herbal medications to improve painful bladder syndrome include marshmallow root tea, bladder ease, aloe vera gel caps also on [employer].  I have had very good results with marshmallow root tea taking it twice a day.    Other options include stress control and physical therapy.

## 2025-03-19 ENCOUNTER — HOSPITAL ENCOUNTER (OUTPATIENT)
Dept: CT IMAGING | Facility: HOSPITAL | Age: 25
Discharge: HOME | End: 2025-03-19
Attending: UROLOGY
Payer: COMMERCIAL

## 2025-03-19 DIAGNOSIS — N20.0 RENAL CALCULI: ICD-10-CM

## 2025-03-19 PROCEDURE — 74176 CT ABD & PELVIS W/O CONTRAST: CPT

## 2025-03-21 LAB
CALCIUM OXALATE DIHYDRATE MFR STONE IR: 50 %
COLOR STONE: NORMAL
COM MFR STONE: 50 %
COMMENT-STONE3: NORMAL
COMPOSITION: NORMAL
LABORATORY COMMENT REPORT: NORMAL
PHOTO: NORMAL
SIZE STONE: NORMAL MM
SPEC SOURCE SUBJ: NORMAL
STONE ANALYSIS-IMP: NORMAL
WT STONE: 8 MG

## 2025-03-25 ENCOUNTER — RESULTS FOLLOW-UP (OUTPATIENT)
Dept: UROLOGY | Facility: CLINIC | Age: 25
End: 2025-03-25

## 2025-03-26 NOTE — TELEPHONE ENCOUNTER
Patient called stating she was returning the call from the office. Message relayed as per encounter below.    Message from Satish Hernandez MD sent at 3/25/2025  1:03 PM EDT -----  Please let patient know CAT scan showed a 1 mm nonobstructing right calculi.  Extremely small and a lower pole likely will not bother her and I recommend a KUB in 2 years.  She should keep the follow-up with nephrology that we set her up for and tell her I recommend trying the herbal medications for interstitial cystitis.  I would like to see her back with me in 4 months or sooner if she prefers to be seen sooner to check on how she is doing.  If she has any questions let her know I will be back in the office and send me a message to call her tomorrow thanks   Patient verbalized understanding and she scheduled her 4 months f/u . No further action needed.

## 2025-03-26 NOTE — TELEPHONE ENCOUNTER
----- Message from Satish Hernandez MD sent at 3/25/2025  1:03 PM EDT -----  Please let patient know CAT scan showed a 1 mm nonobstructing right calculi.  Extremely small and a lower pole likely will not bother her and I recommend a KUB in 2 years.  She should keep the follow-up with nephrology that we set her up for and tell her I recommend trying the herbal medications for interstitial cystitis.  I would like to see her back with me in 4 months or sooner if she prefers to be seen sooner to check on how she is doing.  If she has any questions let her know I will be back in the office and send me a message to call her tomorrow thanks

## 2025-04-01 ENCOUNTER — CONSULT (OUTPATIENT)
Age: 25
End: 2025-04-01
Payer: COMMERCIAL

## 2025-04-01 ENCOUNTER — TELEPHONE (OUTPATIENT)
Age: 25
End: 2025-04-01

## 2025-04-01 VITALS
SYSTOLIC BLOOD PRESSURE: 110 MMHG | HEART RATE: 74 BPM | OXYGEN SATURATION: 99 % | DIASTOLIC BLOOD PRESSURE: 72 MMHG | HEIGHT: 68 IN | WEIGHT: 136.2 LBS | BODY MASS INDEX: 20.64 KG/M2 | TEMPERATURE: 98.2 F

## 2025-04-01 DIAGNOSIS — N20.0 RENAL CALCULI: ICD-10-CM

## 2025-04-01 PROCEDURE — 99243 OFF/OP CNSLTJ NEW/EST LOW 30: CPT | Performed by: INTERNAL MEDICINE

## 2025-04-01 NOTE — ASSESSMENT & PLAN NOTE
Patient had kidney stone evaluated found to be 100% calcium oxalate, however 50% was monohydrate and the other 50% was dihydrate.  Patient was provided with a low oxalate diet, and recommended to continue to drink at least 100 ounces a day with the appropriate fluids that are low in oxalate.    Recommend for the patient to try to transition to a lower sodium diet, goal is to be less than 2000 mg of sodium daily.    In addition, given that the patient has no family history of kidney stones and she has had multiple episodes of passages and retained stones, recommended proceeding with genetic evaluation to rule out potential de mutation versus other potential finding, including recessive traits.  In the meantime, we will also check a parathyroid hormone level, as well as other electrolytes as noted under orders.    We will look to the results as they come through and provide additional recommendations regarding treatment as indicated.  We will see her back for regular visit in about 6-8 weeks to review all results and offer further consultation and counseling regarding care going forward.

## 2025-04-01 NOTE — PROGRESS NOTES
[facility] [place] Linton DO    Name: Shilpi Branham  YOB: 2000      Assessment/Plan:    Renal calculi  Patient had kidney stone evaluated found to be 100% calcium oxalate, however 50% was monohydrate and the other 50% was dihydrate.  Patient was provided with a low oxalate diet, and recommended to continue to drink at least 100 ounces a day with the appropriate fluids that are low in oxalate.    Recommend for the patient to try to transition to a lower sodium diet, goal is to be less than 2000 mg of sodium daily.    In addition, given that the patient has no family history of kidney stones and she has had multiple episodes of passages and retained stones, recommended proceeding with genetic evaluation to rule out potential de mutation versus other potential finding, including recessive traits.  In the meantime, we will also check a parathyroid hormone level, as well as other electrolytes as noted under orders.    We will look to the results as they come through and provide additional recommendations regarding treatment as indicated.  We will see her back for regular visit in about 6-8 weeks to review all results and offer further consultation and counseling regarding care going forward.         Problem List Items Addressed This Visit          Genitourinary    Renal calculi    Patient had kidney stone evaluated found to be 100% calcium oxalate, however 50% was monohydrate and the other 50% was dihydrate.  Patient was provided with a low oxalate diet, and recommended to continue to drink at least 100 ounces a day with the appropriate fluids that are low in oxalate.    Recommend for the patient to try to transition to a lower sodium diet, goal is to be less than 2000 mg of sodium daily.    In addition, given that the patient has no family history of kidney stones and she has had multiple episodes of passages and retained stones, recommended proceeding with  genetic evaluation to rule out potential de mutation versus other potential finding, including recessive traits.  In the meantime, we will also check a parathyroid hormone level, as well as other electrolytes as noted under orders.    We will look to the results as they come through and provide additional recommendations regarding treatment as indicated.  We will see her back for regular visit in about 6-8 weeks to review all results and offer further consultation and counseling regarding care going forward.         Relevant Orders    Vitamin D 25 hydroxy    PTH, intact    Magnesium    Phosphorus    [facility] Kidney Stone Panel       Thank you for allowing us to participate in the care of your patient.  Please refer above for details.  We will continue the workup process regarding kidney stone formation including genetic evaluation.  As the patient has results come through, we will inform her of those results over the phone and ultimately see her for regular follow-up appointment in late May 2025 with additional evaluation or treatment as indicated at that time.    Subjective:      Patient ID: Shilpi Branham is a 24 y.o. female.    Patient presents for initial evaluation regarding nephrolithiasis.    We reviewed labs in detail, most recent creatinine noted to be     There were no significant electrolyte abnormalities noted.  Patient is taking all medications as prescribed with no specific side effects and denies use of nonsteroid anti-inflammatory medications.    Patient's first episode of kidney stone was about 2 years ago, when she was 22, with severe pain on the right side and ultimately passed a kidney stone.  More recently she passed an additional kidney stone also for the right side.  CT scan noted that she continues to have at least 2 retained kidney stones also on the right side.  She is trying to increase her total fluid intake to greater than 100 fluid ounces daily.          The following portions of the  patient's history were reviewed and updated as appropriate: allergies, current medications, past family history, past medical history, past social history, past surgical history and problem list.    Review of Systems   All other systems reviewed and are negative.        Social History     Socioeconomic History    Marital status: Single     Spouse name: None    Number of children: None    Years of education: None    Highest education level: None   Occupational History    None   Tobacco Use    Smoking status: Never    Smokeless tobacco: Never   Vaping Use    Vaping status: Some Days    Substances: Nicotine, Flavoring   Substance and Sexual Activity    Alcohol use: Yes     Comment: rarely    Drug use: Never    Sexual activity: Yes     Partners: Male     Birth control/protection: Condom Male, OCP   Other Topics Concern    None   Social History Narrative    None     Social Drivers of Health     Financial Resource Strain: Patient Declined (8/12/2024)    Received from [facility]    Overall Financial Resource Strain (CARDIA)     Difficulty of Paying Living Expenses: Patient declined   Food Insecurity: No Food Insecurity (8/12/2024)    Received from [facility]    Hunger Vital Sign     Worried About Running Out of Food in the Last Year: Never true     Ran Out of Food in the Last Year: Never true   Transportation Needs: No Transportation Needs (8/12/2024)    Received from [facility]    PRAPARE - Transportation     Lack of Transportation (Medical): No     Lack of Transportation (Non-Medical): No   Physical Activity: Not on file   Stress: Not on file   Social Connections: Feeling Socially Integrated (8/12/2024)    Received from [facility]    OASIS : Social Isolation     How often do you feel lonely or isolated from those around you?: Rarely   Intimate Partner Violence: Not At Risk (8/12/2024)    Received from [facility]    Humiliation,  Afraid, Rape, and Kick questionnaire     Fear of Current or Ex-Partner: No     Emotionally Abused: No     Physically Abused: No     Sexually Abused: No   Housing Stability: Low Risk  (8/12/2024)    Received from [facility]    Housing Stability Vital Sign     Unable to Pay for Housing in the Last Year: No     Number of Times Moved in the Last Year: 1     Homeless in the Last Year: No     Past Medical History:   Diagnosis Date    Interstitial cystitis     diagnosed 12/20/24    Kidney stone 4/15/2022    first kidney stone found on 4/15/2022. Another one found on 01/25/25    Kidney stones     Known health problems: none     Urinary tract infection     Vaginal infection     Just finished medicine for a yeast infection & BV     Past Surgical History:   Procedure Laterality Date    WISDOM TOOTH EXTRACTION         Current Outpatient Medications:     fluticasone (FLONASE) 50 mcg/act nasal spray, 1 spray into each nostril daily, Disp: , Rfl:     ibuprofen (MOTRIN) 800 mg tablet, Take 1 tablet (800 mg total) by mouth 3 (three) times a day, Disp: 21 tablet, Rfl: 0    Multiple Vitamins-Minerals (Multi Complete) CAPS, Take by mouth in the morning, Disp: , Rfl:     norethindrone (MICRONOR) 0.35 MG tablet, Take 0.35 mg by mouth daily, Disp: , Rfl:     ondansetron (ZOFRAN) 4 mg tablet, Take 1 tablet (4 mg total) by mouth every 6 (six) hours, Disp: 20 tablet, Rfl: 0    Lab Results   Component Value Date    SODIUM 136 01/25/2025    K 3.7 01/25/2025     01/25/2025    CO2 27 01/25/2025    AGAP 6 01/25/2025    BUN 18 01/25/2025    CREATININE 0.76 01/25/2025    GLUC 90 01/25/2025    CALCIUM 9.5 01/25/2025    AST 14 01/25/2025    ALT 11 01/25/2025    ALKPHOS 50 01/25/2025    TP 7.1 01/25/2025    TBILI 0.76 01/25/2025    EGFR 110 01/25/2025     Lab Results   Component Value Date    WBC 9.56 01/25/2025    HGB 13.3 01/25/2025    HCT 39.7 01/25/2025    MCV 91 01/25/2025     01/25/2025     No results found for:  "\"CHOLESTEROL\"  No results found for: \"HDL\"  No results found for: \"LDLCALC\"  No results found for: \"TRIG\"  No results found for: \"CHOLHDL\"  Lab Results   Component Value Date    TSH 1.04 08/23/2024     Lab Results   Component Value Date    CALCIUM 9.5 01/25/2025     No results found for: \"SPEP\", \"UPEP\"  No results found for: \"MICROALBUR\", \"[id number]\"        Objective:      /72 (BP Location: Left arm, Patient Position: Sitting, Cuff Size: Standard)   Pulse 74   Temp 98.2 °F (36.8 °C) (Temporal)   Ht 5' 8\" (1.727 m)   Wt 61.8 kg (136 lb 3.2 oz)   SpO2 99%   BMI 20.71 kg/m²          Physical Exam  Vitals reviewed.   Constitutional:       General: She is not in acute distress.     Appearance: Normal appearance.   HENT:      Head: Normocephalic and atraumatic.      Right Ear: External ear normal.      Left Ear: External ear normal.   Eyes:      Conjunctiva/sclera: Conjunctivae normal.   Cardiovascular:      Rate and Rhythm: Normal rate and regular rhythm.      Heart sounds: Normal heart sounds.   Pulmonary:      Effort: No respiratory distress.      Breath sounds: No wheezing.   Abdominal:      Palpations: Abdomen is soft.   Skin:     General: Skin is warm and dry.   Neurological:      General: No focal deficit present.      Mental Status: She is alert and oriented to person, place, and time.   Psychiatric:         Mood and Affect: Mood normal.         Behavior: Behavior normal.         "

## 2025-04-01 NOTE — TELEPHONE ENCOUNTER
ABY contacted for a  of [facility]. Location [address] [facility].    04/01/2025    CONFIRMATION # [id number]

## 2025-04-29 ENCOUNTER — RESULTS FOLLOW-UP (OUTPATIENT)
Dept: OTHER | Facility: HOSPITAL | Age: 25
End: 2025-04-29

## 2025-05-15 ENCOUNTER — TELEPHONE (OUTPATIENT)
Dept: UROLOGY | Facility: CLINIC | Age: 25
End: 2025-05-15

## 2025-05-15 NOTE — TELEPHONE ENCOUNTER
Lm for pt to call and resched her 7/23 apt with Dr Hernandez.  LM to see if she can come in on 7/22 at 917

## 2025-05-28 ENCOUNTER — OFFICE VISIT (OUTPATIENT)
Age: 25
End: 2025-05-28
Payer: COMMERCIAL

## 2025-05-28 VITALS
BODY MASS INDEX: 21.44 KG/M2 | DIASTOLIC BLOOD PRESSURE: 90 MMHG | SYSTOLIC BLOOD PRESSURE: 127 MMHG | WEIGHT: 141 LBS | OXYGEN SATURATION: 96 % | HEART RATE: 84 BPM | TEMPERATURE: 98.2 F

## 2025-05-28 DIAGNOSIS — N20.0 RENAL CALCULI: Primary | ICD-10-CM

## 2025-05-28 PROCEDURE — 99214 OFFICE O/P EST MOD 30 MIN: CPT | Performed by: INTERNAL MEDICINE

## 2025-05-28 RX ORDER — HYDROCHLOROTHIAZIDE 12.5 MG/1
12.5 TABLET ORAL DAILY
Qty: 30 TABLET | Refills: 2 | Status: SHIPPED | OUTPATIENT
Start: 2025-05-28

## 2025-05-28 RX ORDER — TAMSULOSIN HYDROCHLORIDE 0.4 MG/1
0.4 CAPSULE ORAL
Qty: 30 CAPSULE | Refills: 1 | Status: SHIPPED | OUTPATIENT
Start: 2025-05-28

## 2025-05-28 NOTE — PROGRESS NOTES
[facility]  Tyler Linton DO    Name: Shilpi Branham  YOB: 2000      Assessment/Plan:    Renal calculi  We reviewed the patient's 24-hour urine in detail.  Please refer to results.  With respect to plan, continue with drinking plenty of fluids, patient will return to a low oxalate diet, and we will add hydrochlorothiazide to assist with hypercalciuria.  Patient is aware that hydrochlorothiazide is a mild diuretic and can drop blood pressures.  She will be careful with position changes and orthostatic symptoms.    If they are not able to be tolerated, she will let us know and stop the medication.    Also, will prescribe tamsulosin to be given to assist with passage of ureteral stones.  Patient is aware that tamsulosin can also decrease blood pressure and do not take both the hydrochlorothiazide and the tamsulosin.    Will repeat a urine study sometime in the summer with the patient on hydrochlorothiazide to see how well she improves from the calcium standpoint, and she will continue with her current diet which is lower in oxalate.  I did not feel it was appropriate to place the patient on a supplement of citrate due to high urine pH.  Will continue to monitor the patient with further adjustments to care depending on she progresses clinically.         Problem List Items Addressed This Visit          Genitourinary    Renal calculi - Primary    We reviewed the patient's 24-hour urine in detail.  Please refer to results.  With respect to plan, continue with drinking plenty of fluids, patient will return to a low oxalate diet, and we will add hydrochlorothiazide to assist with hypercalciuria.  Patient is aware that hydrochlorothiazide is a mild diuretic and can drop blood pressures.  She will be careful with position changes and orthostatic symptoms.    If they are not able to be tolerated, she will let us know and stop the medication.    Also, will prescribe  tamsulosin to be given to assist with passage of ureteral stones.  Patient is aware that tamsulosin can also decrease blood pressure and do not take both the hydrochlorothiazide and the tamsulosin.    Will repeat a urine study sometime in the summer with the patient on hydrochlorothiazide to see how well she improves from the calcium standpoint, and she will continue with her current diet which is lower in oxalate.  I did not feel it was appropriate to place the patient on a supplement of citrate due to high urine pH.  Will continue to monitor the patient with further adjustments to care depending on she progresses clinically.         Relevant Medications    hydroCHLOROthiazide 12.5 mg tablet    tamsulosin (FLOMAX) 0.4 mg    Other Relevant Orders    [facility] Kidney Stone Panel    Basic metabolic panel       Patient stable at this time, will start hydrochlorothiazide to assist with hypercalciuria, check a basic metabolic panel in about 2 weeks.  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    Subjective:      Patient ID: Shilpi Branham is a 24 y.o. female.    Patient presents for follow up.    We reviewed labs in detail, most recent creatinine noted to be 0.76 mg/dL.    We reviewed the patient's 24-hour urine.  Patient have excess calcium in the urine, urine pH slightly elevated, and although the patient has an elevated risk for calcium phosphate stones, she has not had evidence of the stone, instead she has had calcium oxalate stones.    There were no significant electrolyte abnormalities noted.  Patient is taking all medications as prescribed with no specific side effects and denies use of nonsteroid anti-inflammatory medications.              The following portions of the patient's history were reviewed and updated as appropriate: allergies, current medications, past family history,  "past medical history, past social history, past surgical history and problem list.    Review of Systems   All other systems reviewed and are negative.        Social History[1]  Past Medical History[2]  Past Surgical History[3]  Current Medications[4]    Lab Results   Component Value Date    SODIUM 136 01/25/2025    K 3.7 01/25/2025     01/25/2025    CO2 27 01/25/2025    AGAP 6 01/25/2025    BUN 18 01/25/2025    CREATININE 0.76 01/25/2025    GLUC 90 01/25/2025    CALCIUM 9.5 01/25/2025    AST 14 01/25/2025    ALT 11 01/25/2025    ALKPHOS 50 01/25/2025    TP 7.1 01/25/2025    TBILI 0.76 01/25/2025    EGFR 110 01/25/2025     Lab Results   Component Value Date    WBC 9.56 01/25/2025    HGB 13.3 01/25/2025    HCT 39.7 01/25/2025    MCV 91 01/25/2025     01/25/2025     No results found for: \"CHOLESTEROL\"  No results found for: \"HDL\"  No results found for: \"LDLCALC\"  No results found for: \"TRIG\"  No results found for: \"CHOLHDL\"  Lab Results   Component Value Date    TSH 1.04 08/23/2024     Lab Results   Component Value Date    CALCIUM 9.5 01/25/2025     No results found for: \"SPEP\", \"UPEP\"  No results found for: \"MICROALBUR\", \"[id number]\"        Objective:      /90 (BP Location: Right arm, Patient Position: Sitting)   Pulse 84   Temp 98.2 °F (36.8 °C)   Wt 64 kg (141 lb)   SpO2 96%   BMI 21.44 kg/m²          Physical Exam  Vitals reviewed.   Constitutional:       General: She is not in acute distress.     Appearance: Normal appearance.   HENT:      Head: Normocephalic and atraumatic.      Right Ear: External ear normal.      Left Ear: External ear normal.     Eyes:      Conjunctiva/sclera: Conjunctivae normal.       Cardiovascular:      Rate and Rhythm: Normal rate and regular rhythm.      Heart sounds: Normal heart sounds.   Pulmonary:      Effort: No respiratory distress.      Breath sounds: No wheezing.   Abdominal:      Palpations: Abdomen is soft.     Skin:     General: Skin is warm and dry. "     Neurological:      General: No focal deficit present.      Mental Status: She is alert and oriented to person, place, and time.     Psychiatric:         Mood and Affect: Mood normal.         Behavior: Behavior normal.                [1]   Social History  Socioeconomic History    Marital status: Single   Tobacco Use    Smoking status: Never    Smokeless tobacco: Never   Vaping Use    Vaping status: Some Days    Substances: Nicotine, Flavoring   Substance and Sexual Activity    Alcohol use: Yes     Comment: rarely    Drug use: Never    Sexual activity: Yes     Partners: Male     Birth control/protection: Condom Male, OCP     Social Drivers of Health     Financial Resource Strain: Patient Declined (8/12/2024)    Received from [facility]    Overall Financial Resource Strain (CARDIA)     Difficulty of Paying Living Expenses: Patient declined   Food Insecurity: No Food Insecurity (8/12/2024)    Received from [facility]    Hunger Vital Sign     Within the past 12 months, you worried that your food would run out before you got the money to buy more.: Never true     Within the past 12 months, the food you bought just didn't last and you didn't have money to get more.: Never true   Transportation Needs: No Transportation Needs (8/12/2024)    Received from [facility]    PRAPARE - Transportation     Lack of Transportation (Medical): No     Lack of Transportation (Non-Medical): No   Social Connections: Feeling Socially Integrated (8/12/2024)    Received from [facility]    OASIS : Social Isolation     How often do you feel lonely or isolated from those around you?: Rarely   Intimate Partner Violence: Not At Risk (8/12/2024)    Received from [facility]    Humiliation, Afraid, Rape, and Kick questionnaire     Within the last year, have you been afraid of your partner or ex-partner?: No     Within the last year, have you been  humiliated or emotionally abused in other ways by your partner or ex-partner?: No     Within the last year, have you been kicked, hit, slapped, or otherwise physically hurt by your partner or ex-partner?: No     Within the last year, have you been raped or forced to have any kind of sexual activity by your partner or ex-partner?: No   Housing Stability: Low Risk  (8/12/2024)    Received from [facility]    Housing Stability Vital Sign     In the last 12 months, was there a time when you were not able to pay the mortgage or rent on time?: No     In the past 12 months, how many times have you moved where you were living?: 1     At any time in the past 12 months, were you homeless or living in a shelter (including now)?: No   [2]   Past Medical History:  Diagnosis Date    Interstitial cystitis     diagnosed 12/20/24    Kidney stone 4/15/2022    first kidney stone found on 4/15/2022. Another one found on 01/25/25    Kidney stones     Known health problems: none     Urinary tract infection     Vaginal infection     Just finished medicine for a yeast infection & BV   [3]   Past Surgical History:  Procedure Laterality Date    WISDOM TOOTH EXTRACTION     [4]   Current Outpatient Medications:     fluticasone (FLONASE) 50 mcg/act nasal spray, 1 spray into each nostril in the morning., Disp: , Rfl:     hydroCHLOROthiazide 12.5 mg tablet, Take 1 tablet (12.5 mg total) by mouth daily, Disp: 30 tablet, Rfl: 2    ibuprofen (MOTRIN) 800 mg tablet, Take 1 tablet (800 mg total) by mouth 3 (three) times a day, Disp: 21 tablet, Rfl: 0    Multiple Vitamins-Minerals (Multi Complete) CAPS, Take by mouth in the morning, Disp: , Rfl:     norethindrone (MICRONOR) 0.35 MG tablet, Take 0.35 mg by mouth in the morning., Disp: , Rfl:     ondansetron (ZOFRAN) 4 mg tablet, Take 1 tablet (4 mg total) by mouth every 6 (six) hours, Disp: 20 tablet, Rfl: 0    tamsulosin (FLOMAX) 0.4 mg, Take 1 capsule (0.4 mg total) by mouth daily  with dinner, Disp: 30 capsule, Rfl: 1

## 2025-05-28 NOTE — ASSESSMENT & PLAN NOTE
We reviewed the patient's 24-hour urine in detail.  Please refer to results.  With respect to plan, continue with drinking plenty of fluids, patient will return to a low oxalate diet, and we will add hydrochlorothiazide to assist with hypercalciuria.  Patient is aware that hydrochlorothiazide is a mild diuretic and can drop blood pressures.  She will be careful with position changes and orthostatic symptoms.    If they are not able to be tolerated, she will let us know and stop the medication.    Also, will prescribe tamsulosin to be given to assist with passage of ureteral stones.  Patient is aware that tamsulosin can also decrease blood pressure and do not take both the hydrochlorothiazide and the tamsulosin.    Will repeat a urine study sometime in the summer with the patient on hydrochlorothiazide to see how well she improves from the calcium standpoint, and she will continue with her current diet which is lower in oxalate.  I did not feel it was appropriate to place the patient on a supplement of citrate due to high urine pH.  Will continue to monitor the patient with further adjustments to care depending on she progresses clinically.

## 2025-05-28 NOTE — PATIENT INSTRUCTIONS
New prescription hydrochlorothiazide (HCTZ) 12.5 mg once daily sent to pharmacy.  Also, tamsulosin (Flomax) sent in to assist with passing a stone.    Please remember that both can reduce blood pressures so no HCTZ if you are taking the Flomax.

## 2025-06-30 NOTE — PROGRESS NOTES
Up office visit on 3/13/2025.  UROLOGY PROGRESS NOTE         NAME: Shilpi Branham  AGE: 24 y.o. SEX: female  : 2000   MRN: [id number]    DATE: 2025  TIME: 9:43 PM    Assessment and Plan   Procedures     Impression:   1. Renal calculi  2. Interstitial cystitis       Plan: Patient doing well on marshmallow root tea, diet and Prelief.  I told her maybe in a year from now she can consider weaning those medication if her stress is controlled and she is doing well.    She is moving to [place] but I told her she is welcome to send us an email or call if she has any questions or concerns happy to help her out.      Chief Complaint   No chief complaint on file.    History of Present Illness     HPI: Shilpi Branham is a 24 y.o. year old female who presents with follow-up office visit from 3/13/2025.  Back in  at [facility] patient had pelvic pain, dysuria and hematuria.  She was diagnosed in  with vaginitis.    She had a CT scan on 2025 that showed a 2 mm right UVJ stone with mild right hydronephrosis there was a small punctate nonobstructing right calculi.  Patient was told in the past she may have painful bladder syndrome they discussed dietary modifications, amitriptyline, Neurontin and hydroxyzine as well as pelvic floor physical therapy.  Also cystoscopy hydrodistention was discussed.      Of note she had passed her stones in .  At last office visit she was having pain with urination intermittently with swelling and pain in the urethra.  She did have pain with intercourse.    I did recommend nephrology evaluation with 24-hour urine testing.  CAT scan at the last office visit showed a couple nonobstructing right lower pole calculi.  Her IC, the patient elected for herbal medication and was going to hold off on Atarax but she was going to consider Prelief.  No, repeat CT on 3/19/2025 that showed a tiny nonobstructing right renal calculi.  Plan is a KUB in 2 years.    Regarding her  interstitial cystitis painful bladder will check her response to the herbal medications.  As well as Prelief in the diet    Patient doing very well with Prelief, the diet and marshmallow root tea.    Reviewed her last CT shows nonobstructing right calculi..  Patient did see nephrology    The following portions of the patient's history were reviewed and updated as appropriate: allergies, current medications, past family history, past medical history, past social history, past surgical history and problem list.  Past Medical History[1]  Past Surgical History[2]  shoulder  Review of Systems     Const: Denies chills, fever and weight loss.  CV: Denies chest pain.  Resp: Denies SOB.  GI: Denies abdominal pain, nausea and vomiting.  : Denies symptoms other than stated above.  Musculo: Denies back pain.    Objective   There were no vitals taken for this visit.    Physical Exam  Const: Appears healthy and well developed. No signs of acute distress present.  Resp: Respirations are regular and unlabored.   CV: Rate is regular. Rhythm is regular.  Abdomen: Abdomen is soft, nontender, and nondistended. Kidneys are not palpable.  : Abdomen soft PVR 12  Psych: Patient's attitude is cooperative. Mood is normal. Affect is normal.    Current Medications   Current Medications[3]        Satish Hernandez MD             [1]   Past Medical History:  Diagnosis Date    Interstitial cystitis     diagnosed 12/20/24    Kidney stone 4/15/2022    first kidney stone found on 4/15/2022. Another one found on 01/25/25    Kidney stones     Known health problems: none     Urinary tract infection     Vaginal infection     Just finished medicine for a yeast infection & BV   [2]   Past Surgical History:  Procedure Laterality Date    WISDOM TOOTH EXTRACTION     [3]   Current Outpatient Medications:     fluticasone (FLONASE) 50 mcg/act nasal spray, 1 spray into each nostril in the morning., Disp: , Rfl:     hydroCHLOROthiazide 12.5 mg tablet, Take 1  tablet (12.5 mg total) by mouth daily, Disp: 30 tablet, Rfl: 2    ibuprofen (MOTRIN) 800 mg tablet, Take 1 tablet (800 mg total) by mouth 3 (three) times a day, Disp: 21 tablet, Rfl: 0    Multiple Vitamins-Minerals (Multi Complete) CAPS, Take by mouth in the morning, Disp: , Rfl:     norethindrone (MICRONOR) 0.35 MG tablet, Take 0.35 mg by mouth in the morning., Disp: , Rfl:     ondansetron (ZOFRAN) 4 mg tablet, Take 1 tablet (4 mg total) by mouth every 6 (six) hours, Disp: 20 tablet, Rfl: 0    tamsulosin (FLOMAX) 0.4 mg, Take 1 capsule (0.4 mg total) by mouth daily with dinner, Disp: 30 capsule, Rfl: 1

## 2025-07-09 ENCOUNTER — OFFICE VISIT (OUTPATIENT)
Dept: UROLOGY | Facility: CLINIC | Age: 25
End: 2025-07-09
Payer: COMMERCIAL

## 2025-07-09 VITALS
WEIGHT: 140 LBS | BODY MASS INDEX: 20.73 KG/M2 | TEMPERATURE: 98 F | HEIGHT: 69 IN | OXYGEN SATURATION: 99 % | HEART RATE: 90 BPM | DIASTOLIC BLOOD PRESSURE: 94 MMHG | SYSTOLIC BLOOD PRESSURE: 128 MMHG

## 2025-07-09 DIAGNOSIS — N30.10 INTERSTITIAL CYSTITIS: ICD-10-CM

## 2025-07-09 DIAGNOSIS — N20.0 RENAL CALCULI: Primary | ICD-10-CM

## 2025-07-09 PROCEDURE — 99213 OFFICE O/P EST LOW 20 MIN: CPT | Performed by: UROLOGY

## 2025-07-27 DIAGNOSIS — N20.0 RENAL CALCULI: ICD-10-CM

## 2025-07-29 RX ORDER — TAMSULOSIN HYDROCHLORIDE 0.4 MG/1
0.4 CAPSULE ORAL
Qty: 30 CAPSULE | Refills: 5 | Status: SHIPPED | OUTPATIENT
Start: 2025-07-29